# Patient Record
Sex: MALE | Race: WHITE | NOT HISPANIC OR LATINO | Employment: OTHER | URBAN - METROPOLITAN AREA
[De-identification: names, ages, dates, MRNs, and addresses within clinical notes are randomized per-mention and may not be internally consistent; named-entity substitution may affect disease eponyms.]

---

## 2020-06-01 ENCOUNTER — APPOINTMENT (OUTPATIENT)
Dept: GENERAL RADIOLOGY | Facility: CLINIC | Age: 65
End: 2020-06-01
Attending: NURSE PRACTITIONER
Payer: COMMERCIAL

## 2020-06-01 ENCOUNTER — HOSPITAL ENCOUNTER (EMERGENCY)
Facility: CLINIC | Age: 65
Discharge: HOME | End: 2020-06-01
Attending: NURSE PRACTITIONER | Admitting: NURSE PRACTITIONER
Payer: COMMERCIAL

## 2020-06-01 VITALS
WEIGHT: 222 LBS | HEIGHT: 68 IN | TEMPERATURE: 98.2 F | HEART RATE: 86 BPM | DIASTOLIC BLOOD PRESSURE: 71 MMHG | RESPIRATION RATE: 16 BRPM | BODY MASS INDEX: 33.65 KG/M2 | SYSTOLIC BLOOD PRESSURE: 132 MMHG | OXYGEN SATURATION: 98 %

## 2020-06-01 DIAGNOSIS — R07.89 CHEST WALL PAIN: ICD-10-CM

## 2020-06-01 LAB
ANION GAP SERPL CALCULATED.3IONS-SCNC: 7 MMOL/L (ref 3–14)
BASOPHILS # BLD AUTO: 0 10E9/L (ref 0–0.2)
BASOPHILS NFR BLD AUTO: 0.2 %
BUN SERPL-MCNC: 17 MG/DL (ref 7–30)
CALCIUM SERPL-MCNC: 9.2 MG/DL (ref 8.5–10.1)
CHLORIDE SERPL-SCNC: 110 MMOL/L (ref 94–109)
CO2 SERPL-SCNC: 25 MMOL/L (ref 20–32)
CREAT SERPL-MCNC: 1.07 MG/DL (ref 0.66–1.25)
DIFFERENTIAL METHOD BLD: NORMAL
EOSINOPHIL # BLD AUTO: 0.1 10E9/L (ref 0–0.7)
EOSINOPHIL NFR BLD AUTO: 2.1 %
ERYTHROCYTE [DISTWIDTH] IN BLOOD BY AUTOMATED COUNT: 13.4 % (ref 10–15)
GFR SERPL CREATININE-BSD FRML MDRD: 73 ML/MIN/{1.73_M2}
GLUCOSE SERPL-MCNC: 110 MG/DL (ref 70–99)
HCT VFR BLD AUTO: 44.1 % (ref 40–53)
HGB BLD-MCNC: 14.5 G/DL (ref 13.3–17.7)
IMM GRANULOCYTES # BLD: 0 10E9/L (ref 0–0.4)
IMM GRANULOCYTES NFR BLD: 0.2 %
INTERPRETATION ECG - MUSE: NORMAL
LYMPHOCYTES # BLD AUTO: 1.3 10E9/L (ref 0.8–5.3)
LYMPHOCYTES NFR BLD AUTO: 23.1 %
MCH RBC QN AUTO: 29.7 PG (ref 26.5–33)
MCHC RBC AUTO-ENTMCNC: 32.9 G/DL (ref 31.5–36.5)
MCV RBC AUTO: 90 FL (ref 78–100)
MONOCYTES # BLD AUTO: 0.8 10E9/L (ref 0–1.3)
MONOCYTES NFR BLD AUTO: 14 %
NEUTROPHILS # BLD AUTO: 3.5 10E9/L (ref 1.6–8.3)
NEUTROPHILS NFR BLD AUTO: 60.4 %
NRBC # BLD AUTO: 0 10*3/UL
NRBC BLD AUTO-RTO: 0 /100
PLATELET # BLD AUTO: 194 10E9/L (ref 150–450)
POTASSIUM SERPL-SCNC: 4 MMOL/L (ref 3.4–5.3)
RBC # BLD AUTO: 4.88 10E12/L (ref 4.4–5.9)
SODIUM SERPL-SCNC: 142 MMOL/L (ref 133–144)
TROPONIN I SERPL-MCNC: <0.015 UG/L (ref 0–0.04)
WBC # BLD AUTO: 5.7 10E9/L (ref 4–11)

## 2020-06-01 PROCEDURE — 99285 EMERGENCY DEPT VISIT HI MDM: CPT | Mod: 25

## 2020-06-01 PROCEDURE — 25000132 ZZH RX MED GY IP 250 OP 250 PS 637: Performed by: NURSE PRACTITIONER

## 2020-06-01 PROCEDURE — 80048 BASIC METABOLIC PNL TOTAL CA: CPT | Performed by: EMERGENCY MEDICINE

## 2020-06-01 PROCEDURE — 71046 X-RAY EXAM CHEST 2 VIEWS: CPT

## 2020-06-01 PROCEDURE — 84484 ASSAY OF TROPONIN QUANT: CPT | Performed by: EMERGENCY MEDICINE

## 2020-06-01 PROCEDURE — 85025 COMPLETE CBC W/AUTO DIFF WBC: CPT | Performed by: EMERGENCY MEDICINE

## 2020-06-01 PROCEDURE — 93005 ELECTROCARDIOGRAM TRACING: CPT

## 2020-06-01 RX ORDER — IBUPROFEN 600 MG/1
600 TABLET, FILM COATED ORAL ONCE
Status: COMPLETED | OUTPATIENT
Start: 2020-06-01 | End: 2020-06-01

## 2020-06-01 RX ORDER — AA/PROT/LYSINE/METHIO/VIT C/B6 50-12.5 MG
400 TABLET ORAL
COMMUNITY
End: 2024-03-26

## 2020-06-01 RX ORDER — METHOCARBAMOL 500 MG/1
1000 TABLET, FILM COATED ORAL ONCE
Status: COMPLETED | OUTPATIENT
Start: 2020-06-01 | End: 2020-06-01

## 2020-06-01 RX ORDER — METHOCARBAMOL 500 MG/1
TABLET, FILM COATED ORAL
Qty: 24 TABLET | Refills: 0 | Status: SHIPPED | OUTPATIENT
Start: 2020-06-01 | End: 2024-03-26

## 2020-06-01 RX ORDER — SERTRALINE HYDROCHLORIDE 100 MG/1
1 TABLET, FILM COATED ORAL
COMMUNITY
Start: 2019-10-31 | End: 2024-03-26

## 2020-06-01 RX ORDER — ATORVASTATIN CALCIUM 80 MG/1
40 TABLET, FILM COATED ORAL DAILY
COMMUNITY
Start: 2019-11-04 | End: 2024-08-01

## 2020-06-01 RX ADMIN — METHOCARBAMOL 1000 MG: 500 TABLET ORAL at 18:52

## 2020-06-01 RX ADMIN — IBUPROFEN 600 MG: 600 TABLET ORAL at 18:52

## 2020-06-01 ASSESSMENT — ENCOUNTER SYMPTOMS
SHORTNESS OF BREATH: 0
CHILLS: 0
FEVER: 0
BACK PAIN: 1
ABDOMINAL PAIN: 0

## 2020-06-01 ASSESSMENT — MIFFLIN-ST. JEOR: SCORE: 1776.49

## 2020-06-01 NOTE — ED PROVIDER NOTES
"  History     Chief Complaint:  Chest Pain      HPI   Eleno Grant is a 64 year old male who presents with a week to week and a half of anterior chest wall and parascapular discomfort.  His pain is increased with movement.  He does not have associated shortness of breath.  He describes the pain as wrapping around it does not go through his chest.  He further describes it as a \"bruised muscle.\"  He has done some gardening work otherwise no heavy lifting.  No trauma that he is aware of.  No cough or cold symptoms.  No other concerns or complaints.    Allergies:  Allergies no known allergies     Medications:    atorvastatin (LIPITOR) 80 MG tablet  methocarbamol (ROBAXIN) 500 MG tablet  sertraline (ZOLOFT) 100 MG tablet  co-enzyme Q-10 30 MG/5ML LIQD liquid        Problem List:    Patient Active Problem List    Diagnosis Date Noted     Mixed hyperlipidemia 12/31/2009     Priority: Medium     on zetia + zocor, prior lipitor       Anxiety state 12/31/2009     Priority: Medium     prior zoloft use, none since about 0756-2290, stable          Past Medical History:    No past medical history on file.    Past Surgical History:    No past surgical history on file.    Family History:    No family history on file.    Social History:  Marital Status:   [2]  Social History     Tobacco Use     Smoking status: Not on file   Substance Use Topics     Alcohol use: Not on file     Drug use: Not on file        Review of Systems   Constitutional: Negative for chills and fever.   Respiratory: Negative for shortness of breath.    Cardiovascular: Positive for chest pain.   Gastrointestinal: Negative for abdominal pain.   Musculoskeletal: Positive for back pain.   Skin: Positive for rash.   All other systems reviewed and are negative.        Physical Exam   First Vitals:  BP: (!) 142/79  Pulse: 86  Heart Rate: 88  Temp: 98.2  F (36.8  C)  Resp: 16  Height: 172.7 cm (5' 8\")  Weight: 100.7 kg (222 lb)  SpO2: 95 %      Physical " Exam  General: Alert, No obvious discomfort, well kept  Eyes: PERRL, conjunctivae pink no scleral icterus or conjunctival injection  ENT:   Moist mucus membranes, posterior oropharynx clear without erythema or exudates, No lymphadenopathy, Normal voice  Resp:  Lungs clear to auscultation bilaterally, no crackles/rubs/wheezes. Good air movement  CV:  Normal rate and rhythm, no murmurs/rubs/gallops, exactly reproducible anterior chest wall and parascapular tenderness.  Parascapular muscular spasming.  GI:  Abdomen soft and non-distended.  Normoactive BS.  No tenderness, guarding or rebound, No masses  Skin:  Warm, dry.  No rashes or petechiae  Musculoskeletal: No peripheral edema or calf tenderness, Normal gross ROM, see above.  Neuro: Alert and oriented to person/place/time, normal sensation  Psychiatric: Normal affect, cooperative, good eye contact    Emergency Department Course   ECG:       EKG Interpretation:      Interpreted by DANIEL Burroughs CNP  Time reviewed:1833  Indication: CP  Vent. Rate 94 bpm. MN interval 150 ms.   QRS duration 86 ms. QT/QTc 346/432 ms. P-R-T axis 60 7 4.   NSR, Normal ekg  No Old      Imaging:  Recent Results (from the past 24 hour(s))   XR Chest 2 Views    Narrative    CHEST TWO VIEWS  6/1/2020 6:49 PM     HISTORY:  Chest pain.    COMPARISON: None.      Impression    IMPRESSION: Tortuous thoracic aorta. Chest otherwise negative. Lungs  clear. No pleural effusions. Heart size and pulmonary vascularity are  within normal limits.    NATALIYA CARD MD       Laboratory:  Recent Results (from the past 8 hour(s))   EKG 12-lead, tracing only    Collection Time: 06/01/20  5:23 PM   Result Value Ref Range    Interpretation ECG Click View Image link to view waveform and result    CBC with platelets differential    Collection Time: 06/01/20  5:34 PM   Result Value Ref Range    WBC 5.7 4.0 - 11.0 10e9/L    RBC Count 4.88 4.4 - 5.9 10e12/L    Hemoglobin 14.5 13.3 - 17.7 g/dL    Hematocrit  44.1 40.0 - 53.0 %    MCV 90 78 - 100 fl    MCH 29.7 26.5 - 33.0 pg    MCHC 32.9 31.5 - 36.5 g/dL    RDW 13.4 10.0 - 15.0 %    Platelet Count 194 150 - 450 10e9/L    Diff Method Automated Method     % Neutrophils 60.4 %    % Lymphocytes 23.1 %    % Monocytes 14.0 %    % Eosinophils 2.1 %    % Basophils 0.2 %    % Immature Granulocytes 0.2 %    Nucleated RBCs 0 0 /100    Absolute Neutrophil 3.5 1.6 - 8.3 10e9/L    Absolute Lymphocytes 1.3 0.8 - 5.3 10e9/L    Absolute Monocytes 0.8 0.0 - 1.3 10e9/L    Absolute Eosinophils 0.1 0.0 - 0.7 10e9/L    Absolute Basophils 0.0 0.0 - 0.2 10e9/L    Abs Immature Granulocytes 0.0 0 - 0.4 10e9/L    Absolute Nucleated RBC 0.0    Basic metabolic panel    Collection Time: 06/01/20  5:34 PM   Result Value Ref Range    Sodium 142 133 - 144 mmol/L    Potassium 4.0 3.4 - 5.3 mmol/L    Chloride 110 (H) 94 - 109 mmol/L    Carbon Dioxide 25 20 - 32 mmol/L    Anion Gap 7 3 - 14 mmol/L    Glucose 110 (H) 70 - 99 mg/dL    Urea Nitrogen 17 7 - 30 mg/dL    Creatinine 1.07 0.66 - 1.25 mg/dL    GFR Estimate 73 >60 mL/min/[1.73_m2]    GFR Estimate If Black 85 >60 mL/min/[1.73_m2]    Calcium 9.2 8.5 - 10.1 mg/dL   Troponin I    Collection Time: 06/01/20  5:34 PM   Result Value Ref Range    Troponin I ES <0.015 0.000 - 0.045 ug/L     Interventions:  Medications   ibuprofen (ADVIL/MOTRIN) tablet 600 mg (600 mg Oral Given 6/1/20 1852)   methocarbamol (ROBAXIN) tablet 1,000 mg (1,000 mg Oral Given 6/1/20 1852)         Emergency Department Course:  Recheck.  I discussed the laboratory and radiology results with the patient and he understands.  The patient felt improved after the above interventions.  The patient will be discharged home to follow up with primary care doctor per discharge instructions.  Indications for return to the ED were discussed and the patient understands.  All questions were answered prior to discharge.       Impression & Plan      Medical Decision Making:  Eleno Grant is a 64  year old male who presents today for evaluation of ongoing chest wall discomfort.  He has had symptoms for approximately a week to week and a half.  His examination today was consistent with a musculoskeletal source.  Symptoms were exactly reproducible with movement and palpation.  He was given the above medication with complete relief of his symptoms.  His laboratory studies show a nonacute EKG and negative troponin.  He has a negative chest x-ray and the remainder of his laboratory studies were noncontributory.  He appears to be safe and appropriate for outpatient management follow-up.  He is given a prescription for Robaxin advised on appropriate use of NSAIDs and is discharged home.      Diagnosis:    ICD-10-CM    1. Chest wall pain  R07.89        Disposition:  discharged to home    Discharge Medications:  Discharge Medication List as of 6/1/2020  8:31 PM      START taking these medications    Details   methocarbamol (ROBAXIN) 500 MG tablet 1-2 tabs q TID PRN for muscle spasm/pain, Disp-24 tablet,R-0, Local Print             DANIEL Burroughs CNP  6/1/2020    EMERGENCY DEPARTMENT       Brody Key APRN CNP  06/01/20 6721

## 2020-06-01 NOTE — ED AVS SNAPSHOT
Emergency Department  [address] [facility] [address]  Phone:  [phone]  Fax:  [phone]                                    Eleno Grant   MRN: [id number]    Department:   Emergency Department   Date of Visit:  6/1/2020           After Visit Summary Signature Page    I have received my discharge instructions, and my questions have been answered. I have discussed any challenges I see with this plan with the nurse or doctor.    ..........................................................................................................................................  Patient/Patient Representative Signature      ..........................................................................................................................................  Patient Representative Print Name and Relationship to Patient    ..................................................               ................................................  Date                                   Time    ..........................................................................................................................................  Reviewed by Signature/Title    ...................................................              ..............................................  Date                                               Time          22EPIC Rev 08/18

## 2020-06-02 NOTE — DISCHARGE INSTRUCTIONS
Ibuprofen or Naproxen and/or Tylenol scheduled for the next 3-5 days. 600 mg (three tabs) ibuprofen, 440 mg (two tabs) Naproxen, 650-1000 mg of Tylenol.  Ibuprofen and Tylenol are every 6 hours Naproxen is 2 times daily. Follow directions. Use OTC lidocaine patches as directed.   Ice or heat to area.  I recommend ice in the first 24-48 hours.

## 2021-01-15 ENCOUNTER — HEALTH MAINTENANCE LETTER (OUTPATIENT)
Age: 66
End: 2021-01-15

## 2021-05-08 ENCOUNTER — HOSPITAL ENCOUNTER (EMERGENCY)
Facility: CLINIC | Age: 66
Discharge: HOME | End: 2021-05-08
Attending: NURSE PRACTITIONER | Admitting: NURSE PRACTITIONER
Payer: COMMERCIAL

## 2021-05-08 ENCOUNTER — APPOINTMENT (OUTPATIENT)
Dept: CT IMAGING | Facility: CLINIC | Age: 66
End: 2021-05-08
Attending: NURSE PRACTITIONER
Payer: COMMERCIAL

## 2021-05-08 VITALS
BODY MASS INDEX: 31.93 KG/M2 | DIASTOLIC BLOOD PRESSURE: 91 MMHG | SYSTOLIC BLOOD PRESSURE: 144 MMHG | RESPIRATION RATE: 16 BRPM | WEIGHT: 210 LBS | OXYGEN SATURATION: 97 % | HEART RATE: 85 BPM | TEMPERATURE: 98 F

## 2021-05-08 DIAGNOSIS — N20.1 URETERAL STONE: ICD-10-CM

## 2021-05-08 DIAGNOSIS — N20.0 KIDNEY STONE: ICD-10-CM

## 2021-05-08 LAB
ALBUMIN UR-MCNC: 20 MG/DL
ANION GAP SERPL CALCULATED.3IONS-SCNC: 1 MMOL/L (ref 3–14)
APPEARANCE UR: CLEAR
BASOPHILS # BLD AUTO: 0 10E9/L (ref 0–0.2)
BASOPHILS NFR BLD AUTO: 0.3 %
BILIRUB UR QL STRIP: NEGATIVE
BUN SERPL-MCNC: 17 MG/DL (ref 7–30)
CALCIUM SERPL-MCNC: 8.6 MG/DL (ref 8.5–10.1)
CHLORIDE SERPL-SCNC: 111 MMOL/L (ref 94–109)
CO2 SERPL-SCNC: 28 MMOL/L (ref 20–32)
COLOR UR AUTO: YELLOW
CREAT SERPL-MCNC: 1.03 MG/DL (ref 0.66–1.25)
DIFFERENTIAL METHOD BLD: NORMAL
EOSINOPHIL # BLD AUTO: 0 10E9/L (ref 0–0.7)
EOSINOPHIL NFR BLD AUTO: 0 %
ERYTHROCYTE [DISTWIDTH] IN BLOOD BY AUTOMATED COUNT: 13.3 % (ref 10–15)
GFR SERPL CREATININE-BSD FRML MDRD: 76 ML/MIN/{1.73_M2}
GLUCOSE SERPL-MCNC: 105 MG/DL (ref 70–99)
GLUCOSE UR STRIP-MCNC: NEGATIVE MG/DL
HCT VFR BLD AUTO: 45.3 % (ref 40–53)
HGB BLD-MCNC: 14.8 G/DL (ref 13.3–17.7)
HGB UR QL STRIP: ABNORMAL
IMM GRANULOCYTES # BLD: 0 10E9/L (ref 0–0.4)
IMM GRANULOCYTES NFR BLD: 0.5 %
KETONES UR STRIP-MCNC: 5 MG/DL
LEUKOCYTE ESTERASE UR QL STRIP: NEGATIVE
LYMPHOCYTES # BLD AUTO: 0.8 10E9/L (ref 0.8–5.3)
LYMPHOCYTES NFR BLD AUTO: 10.2 %
MCH RBC QN AUTO: 29.4 PG (ref 26.5–33)
MCHC RBC AUTO-ENTMCNC: 32.7 G/DL (ref 31.5–36.5)
MCV RBC AUTO: 90 FL (ref 78–100)
MONOCYTES # BLD AUTO: 0.5 10E9/L (ref 0–1.3)
MONOCYTES NFR BLD AUTO: 6.7 %
MUCOUS THREADS #/AREA URNS LPF: PRESENT /LPF
NEUTROPHILS # BLD AUTO: 6.3 10E9/L (ref 1.6–8.3)
NEUTROPHILS NFR BLD AUTO: 82.3 %
NITRATE UR QL: NEGATIVE
NRBC # BLD AUTO: 0 10*3/UL
NRBC BLD AUTO-RTO: 0 /100
PH UR STRIP: 6 PH (ref 5–7)
PLATELET # BLD AUTO: 210 10E9/L (ref 150–450)
POTASSIUM SERPL-SCNC: 4.4 MMOL/L (ref 3.4–5.3)
RBC # BLD AUTO: 5.04 10E12/L (ref 4.4–5.9)
RBC #/AREA URNS AUTO: >182 /HPF (ref 0–2)
SODIUM SERPL-SCNC: 140 MMOL/L (ref 133–144)
SOURCE: ABNORMAL
SP GR UR STRIP: 1.03 (ref 1–1.03)
SQUAMOUS #/AREA URNS AUTO: 0 /HPF (ref 0–1)
UROBILINOGEN UR STRIP-MCNC: 0 MG/DL (ref 0–2)
WBC # BLD AUTO: 7.7 10E9/L (ref 4–11)
WBC #/AREA URNS AUTO: 1 /HPF (ref 0–5)

## 2021-05-08 PROCEDURE — 80048 BASIC METABOLIC PNL TOTAL CA: CPT | Performed by: NURSE PRACTITIONER

## 2021-05-08 PROCEDURE — 81001 URINALYSIS AUTO W/SCOPE: CPT | Performed by: NURSE PRACTITIONER

## 2021-05-08 PROCEDURE — 250N000011 HC RX IP 250 OP 636: Performed by: NURSE PRACTITIONER

## 2021-05-08 PROCEDURE — 96374 THER/PROPH/DIAG INJ IV PUSH: CPT

## 2021-05-08 PROCEDURE — 96361 HYDRATE IV INFUSION ADD-ON: CPT

## 2021-05-08 PROCEDURE — 74176 CT ABD & PELVIS W/O CONTRAST: CPT

## 2021-05-08 PROCEDURE — 99285 EMERGENCY DEPT VISIT HI MDM: CPT | Mod: 25

## 2021-05-08 PROCEDURE — 85025 COMPLETE CBC W/AUTO DIFF WBC: CPT | Performed by: NURSE PRACTITIONER

## 2021-05-08 PROCEDURE — 258N000003 HC RX IP 258 OP 636: Performed by: NURSE PRACTITIONER

## 2021-05-08 PROCEDURE — 96375 TX/PRO/DX INJ NEW DRUG ADDON: CPT

## 2021-05-08 RX ORDER — ONDANSETRON 4 MG/1
4 TABLET, ORALLY DISINTEGRATING ORAL EVERY 8 HOURS PRN
Qty: 10 TABLET | Refills: 0 | Status: SHIPPED | OUTPATIENT
Start: 2021-05-08 | End: 2021-05-11

## 2021-05-08 RX ORDER — OXYCODONE AND ACETAMINOPHEN 5; 325 MG/1; MG/1
1-2 TABLET ORAL EVERY 4 HOURS PRN
Qty: 12 TABLET | Refills: 0 | Status: SHIPPED | OUTPATIENT
Start: 2021-05-08 | End: 2024-03-26

## 2021-05-08 RX ORDER — ONDANSETRON 2 MG/ML
4 INJECTION INTRAMUSCULAR; INTRAVENOUS
Status: COMPLETED | OUTPATIENT
Start: 2021-05-08 | End: 2021-05-08

## 2021-05-08 RX ORDER — KETOROLAC TROMETHAMINE 15 MG/ML
15 INJECTION, SOLUTION INTRAMUSCULAR; INTRAVENOUS ONCE
Status: COMPLETED | OUTPATIENT
Start: 2021-05-08 | End: 2021-05-08

## 2021-05-08 RX ORDER — TAMSULOSIN HYDROCHLORIDE 0.4 MG/1
0.4 CAPSULE ORAL DAILY
Qty: 10 CAPSULE | Refills: 0 | Status: SHIPPED | OUTPATIENT
Start: 2021-05-08 | End: 2021-05-18

## 2021-05-08 RX ADMIN — KETOROLAC TROMETHAMINE 15 MG: 15 INJECTION, SOLUTION INTRAMUSCULAR; INTRAVENOUS at 11:56

## 2021-05-08 RX ADMIN — ONDANSETRON 4 MG: 2 INJECTION INTRAMUSCULAR; INTRAVENOUS at 11:56

## 2021-05-08 RX ADMIN — SODIUM CHLORIDE 1000 ML: 9 INJECTION, SOLUTION INTRAVENOUS at 11:56

## 2021-05-08 ASSESSMENT — ENCOUNTER SYMPTOMS
DYSURIA: 0
VOMITING: 0
HEMATURIA: 0
SHORTNESS OF BREATH: 0
CHILLS: 0
FEVER: 0
ABDOMINAL PAIN: 1
NAUSEA: 1
FLANK PAIN: 1
DIAPHORESIS: 1
COUGH: 0

## 2021-05-08 NOTE — ED TRIAGE NOTES
R flank pain, intermittent. Started at 0500. Some nausea. No urinary symptoms. No hx kidney stones

## 2021-05-08 NOTE — ED PROVIDER NOTES
History   Chief Complaint:  Flank Pain    HPI   Eleno Grant is a 65 year old male, with a history of hyperlipidemia, who presents to the ED for evaluation of flank pain. The patient reports he had the onset of sharp, intermittent, right flank pain this morning at 0500. He states he became nauseous, diaphoretic, and had to lie on the floor, but did not vomit. He took one Aleve, but did not have any improvement in his symptoms, therefore, he came into the emergency department. He has not had any fever, chills, or shortness of breath. He denies chest pain. He notes the pain can somewhat wrap into his right lower abdomen, but not very intense. He notes the pain is mostly a dull pain when it is there, but will have those sharp moments now. He has not noticed any hematuria and he has no dysuria.     Review of Systems   Constitutional: Positive for diaphoresis. Negative for chills and fever.   Respiratory: Negative for cough and shortness of breath.    Cardiovascular: Negative for chest pain.   Gastrointestinal: Positive for abdominal pain and nausea. Negative for vomiting.   Genitourinary: Positive for flank pain. Negative for dysuria and hematuria.   All other systems reviewed and are negative.    Allergies:  No known drug allergies    Medications:    Lipitor  Zoloft    Past Medical History:    Hyperlipidemia  Anxiety    Past Surgical History:    Left ACL reconstruction  [place] teeth extraction  Vasectomy    Family History:    Atrial fibrillation  Hyperlipidemia  Alzheimer disease    Social History:  PCP: Emelia Jha  Presents to the ED with spouse    Physical Exam     Patient Vitals for the past 24 hrs:   BP Temp Temp src Pulse Resp SpO2 Weight   05/08/21 1353 -- -- -- 85 16 97 % --   05/08/21 1124 (!) 144/91 98  F (36.7  C) Oral 86 16 97 % 95.3 kg (210 lb)       Physical Exam  Nursing notes reviewed. Vitals reviewed.  General: Alert. Well kept.  Eyes:  Conjunctiva non-injected, non-icteric.  Neck/Throat:  Moist mucous membranes.  Normal voice.  Cardiac: Regular rhythm. Normal heart sounds with no murmur/rubs/click.   Pulmonary: Clear and equal breath sounds bilaterally. No crackles/rales. No wheezing  Abdomen: Soft. Non-distended. No masses. No guarding or rebound. Right sided CVA tenderness.  Musculoskeletal: Normal gross range of motion of all 4 extremities.    Neurological: Alert and oriented x4.   Skin: Warm and dry without rashes or petechiae. Normal appearance of visualized exposed skin.  Psych: Affect normal. Good eye contact.    Emergency Department Course   Imaging:    CT Abd/pelvis with contrast:  3 mm calculus in the proximal right ureter causes mild   hydronephrosis and proximal hydroureter. There is an additional 5 mm   calcification in the right kidney.     Imaging independently reviewed and agree with radiologist interpretation.     Laboratory:  CBC: WBC 7.7, HGB 14.8,     CMP: Cl 111 (H), Anion Gap 1 (L),  (H), o/w WNL (Creatinine 1.03)    UA: Ketone 5, Blood Large, Protein Albumin 20, RBC/HPF >182 (H), Mucous Present, o/w Negative    Emergency Department Course:    Reviewed:  I reviewed the patient's nursing notes, vitals, past available medical records.     Assessments:  1127: I obtained history and examined the patient as noted above.     1305: I rechecked the patient and explained findings. The patient feels much improved and is amendable to discharge. All questions answered.     Interventions:  1156: NS 1L IV Bolus   1156: Zofran 4 mg IV  1156: Toradol 15 mg IV    Disposition:  The patient was discharged to home.    Impression & Plan    Medical Decision Making:  The patient presented with unilateral flank, with minimal abdominal pain, consistent with renal colic. The patient reports he woke up this morning at 0500 with initially sharp right flank pain, which reduced to a constant dull ache. he had not had this pain before. He was nauseous due to the pain, but did not vomit, and had  little to no relief with Aleve, which prompted his emergency department visit today. CT confirms a ureteral stone. Pain is controlled with interventions in the emergency department. There is no fever or evidence of a urinary tract infection. The patient will be discharged with opioid analgesics and ibuprofen for pain. Flomax will be prescribed daily to attempt to ease stone passage.   Zofran was prescribed for nausea.  I considered other etiologies for these symptoms including AAA and pyelonephritis but these are unlikely given the otherwise normal CT scan and urinalysis.  The patient is instructed to return if increasing pain not controlled with pain meds, vomiting, and fever. Strain urine to look for stone, if detected, submit to primary doctor for lab analysis. Instructions were given to follow up with urology within one week, sooner if pain continues, as retrieval of the stone may be required for refractory symptoms.    Diagnosis:    ICD-10-CM    1. Ureteral stone  N20.1    2. Kidney stone  N20.0        Discharge Medications:  New Prescriptions    ONDANSETRON (ZOFRAN ODT) 4 MG ODT TAB    Take 1 tablet (4 mg) by mouth every 8 hours as needed    OXYCODONE-ACETAMINOPHEN (PERCOCET) 5-325 MG TABLET    Take 1-2 tablets by mouth every 4 hours as needed for pain    TAMSULOSIN (FLOMAX) 0.4 MG CAPSULE    Take 1 capsule (0.4 mg) by mouth daily for 10 doses     Scribe Disclosure:  Alireza LEDESMA, am serving as a scribe at 11:27 AM on 5/8/2021 to document services personally performed by Diane Rocha DNP based on my observations and the provider's statements to me.      Diane Rocha, CNP  05/08/21 9209

## 2021-08-29 ENCOUNTER — HEALTH MAINTENANCE LETTER (OUTPATIENT)
Age: 66
End: 2021-08-29

## 2021-10-24 ENCOUNTER — HEALTH MAINTENANCE LETTER (OUTPATIENT)
Age: 66
End: 2021-10-24

## 2022-10-15 ENCOUNTER — HEALTH MAINTENANCE LETTER (OUTPATIENT)
Age: 67
End: 2022-10-15

## 2023-01-11 ENCOUNTER — HOSPITAL ENCOUNTER (EMERGENCY)
Facility: CLINIC | Age: 68
Discharge: LEFT BEFORE DISCHARGE | End: 2023-01-11
Payer: COMMERCIAL

## 2023-01-11 NOTE — ED TRIAGE NOTES
Right flank pain for a couple days. Hx kidney stones. Feels like a stone. Denies fevers.      Triage Assessment     Row Name 01/11/23 2146       Triage Assessment (Adult)    Airway WDL WDL

## 2023-03-26 ENCOUNTER — HEALTH MAINTENANCE LETTER (OUTPATIENT)
Age: 68
End: 2023-03-26

## 2024-03-26 ENCOUNTER — HOSPITAL ENCOUNTER (OUTPATIENT)
Facility: CLINIC | Age: 69
Setting detail: OBSERVATION
Discharge: HOME | End: 2024-03-27
Attending: EMERGENCY MEDICINE | Admitting: STUDENT IN AN ORGANIZED HEALTH CARE EDUCATION/TRAINING PROGRAM
Payer: COMMERCIAL

## 2024-03-26 ENCOUNTER — APPOINTMENT (OUTPATIENT)
Dept: GENERAL RADIOLOGY | Facility: CLINIC | Age: 69
End: 2024-03-26
Attending: EMERGENCY MEDICINE
Payer: COMMERCIAL

## 2024-03-26 DIAGNOSIS — I48.91 ATRIAL FIBRILLATION WITH RVR (H): ICD-10-CM

## 2024-03-26 DIAGNOSIS — R55 SYNCOPE, UNSPECIFIED SYNCOPE TYPE: ICD-10-CM

## 2024-03-26 LAB
ALBUMIN SERPL BCG-MCNC: 4.2 G/DL (ref 3.5–5.2)
ALP SERPL-CCNC: 70 U/L (ref 40–150)
ALT SERPL W P-5'-P-CCNC: NORMAL U/L
ANION GAP SERPL CALCULATED.3IONS-SCNC: 8 MMOL/L (ref 7–15)
AST SERPL W P-5'-P-CCNC: 38 U/L (ref 0–45)
ATRIAL RATE - MUSE: 86 BPM
ATRIAL RATE - MUSE: NORMAL BPM
BASOPHILS # BLD AUTO: 0 10E3/UL (ref 0–0.2)
BASOPHILS NFR BLD AUTO: 0 %
BILIRUB DIRECT SERPL-MCNC: <0.2 MG/DL (ref 0–0.3)
BILIRUB SERPL-MCNC: 0.4 MG/DL
BUN SERPL-MCNC: 17.9 MG/DL (ref 8–23)
CALCIUM SERPL-MCNC: 9.6 MG/DL (ref 8.8–10.2)
CHLORIDE SERPL-SCNC: 105 MMOL/L (ref 98–107)
CREAT SERPL-MCNC: 1.29 MG/DL (ref 0.67–1.17)
DEPRECATED HCO3 PLAS-SCNC: 27 MMOL/L (ref 22–29)
DIASTOLIC BLOOD PRESSURE - MUSE: NORMAL MMHG
DIASTOLIC BLOOD PRESSURE - MUSE: NORMAL MMHG
EGFRCR SERPLBLD CKD-EPI 2021: 61 ML/MIN/1.73M2
EOSINOPHIL # BLD AUTO: 0.1 10E3/UL (ref 0–0.7)
EOSINOPHIL NFR BLD AUTO: 1 %
ERYTHROCYTE [DISTWIDTH] IN BLOOD BY AUTOMATED COUNT: 13.4 % (ref 10–15)
GLUCOSE SERPL-MCNC: 98 MG/DL (ref 70–99)
HCT VFR BLD AUTO: 45.5 % (ref 40–53)
HGB BLD-MCNC: 15 G/DL (ref 13.3–17.7)
IMM GRANULOCYTES # BLD: 0 10E3/UL
IMM GRANULOCYTES NFR BLD: 0 %
INTERPRETATION ECG - MUSE: NORMAL
INTERPRETATION ECG - MUSE: NORMAL
LYMPHOCYTES # BLD AUTO: 1.3 10E3/UL (ref 0.8–5.3)
LYMPHOCYTES NFR BLD AUTO: 18 %
MCH RBC QN AUTO: 29.7 PG (ref 26.5–33)
MCHC RBC AUTO-ENTMCNC: 33 G/DL (ref 31.5–36.5)
MCV RBC AUTO: 90 FL (ref 78–100)
MONOCYTES # BLD AUTO: 0.8 10E3/UL (ref 0–1.3)
MONOCYTES NFR BLD AUTO: 12 %
NEUTROPHILS # BLD AUTO: 4.8 10E3/UL (ref 1.6–8.3)
NEUTROPHILS NFR BLD AUTO: 69 %
NRBC # BLD AUTO: 0 10E3/UL
NRBC BLD AUTO-RTO: 0 /100
P AXIS - MUSE: 61 DEGREES
P AXIS - MUSE: NORMAL DEGREES
PLATELET # BLD AUTO: 201 10E3/UL (ref 150–450)
POTASSIUM SERPL-SCNC: 5.3 MMOL/L (ref 3.4–5.3)
PR INTERVAL - MUSE: 152 MS
PR INTERVAL - MUSE: NORMAL MS
PROT SERPL-MCNC: 6.8 G/DL (ref 6.4–8.3)
QRS DURATION - MUSE: 76 MS
QRS DURATION - MUSE: 84 MS
QT - MUSE: 312 MS
QT - MUSE: 368 MS
QTC - MUSE: 408 MS
QTC - MUSE: 477 MS
R AXIS - MUSE: 10 DEGREES
R AXIS - MUSE: 30 DEGREES
RBC # BLD AUTO: 5.05 10E6/UL (ref 4.4–5.9)
SODIUM SERPL-SCNC: 140 MMOL/L (ref 135–145)
SYSTOLIC BLOOD PRESSURE - MUSE: NORMAL MMHG
SYSTOLIC BLOOD PRESSURE - MUSE: NORMAL MMHG
T AXIS - MUSE: 30 DEGREES
T AXIS - MUSE: 7 DEGREES
TROPONIN T SERPL HS-MCNC: 22 NG/L
TSH SERPL DL<=0.005 MIU/L-ACNC: 1.58 UIU/ML (ref 0.3–4.2)
VENTRICULAR RATE- MUSE: 141 BPM
VENTRICULAR RATE- MUSE: 74 BPM
WBC # BLD AUTO: 7.1 10E3/UL (ref 4–11)

## 2024-03-26 PROCEDURE — 93005 ELECTROCARDIOGRAM TRACING: CPT | Mod: 76

## 2024-03-26 PROCEDURE — G0378 HOSPITAL OBSERVATION PER HR: HCPCS

## 2024-03-26 PROCEDURE — 96361 HYDRATE IV INFUSION ADD-ON: CPT

## 2024-03-26 PROCEDURE — 84443 ASSAY THYROID STIM HORMONE: CPT | Performed by: STUDENT IN AN ORGANIZED HEALTH CARE EDUCATION/TRAINING PROGRAM

## 2024-03-26 PROCEDURE — 84075 ASSAY ALKALINE PHOSPHATASE: CPT | Performed by: EMERGENCY MEDICINE

## 2024-03-26 PROCEDURE — 85025 COMPLETE CBC W/AUTO DIFF WBC: CPT | Performed by: EMERGENCY MEDICINE

## 2024-03-26 PROCEDURE — 36415 COLL VENOUS BLD VENIPUNCTURE: CPT | Performed by: EMERGENCY MEDICINE

## 2024-03-26 PROCEDURE — 71046 X-RAY EXAM CHEST 2 VIEWS: CPT

## 2024-03-26 PROCEDURE — 99223 1ST HOSP IP/OBS HIGH 75: CPT | Performed by: STUDENT IN AN ORGANIZED HEALTH CARE EDUCATION/TRAINING PROGRAM

## 2024-03-26 PROCEDURE — 99285 EMERGENCY DEPT VISIT HI MDM: CPT | Mod: 25

## 2024-03-26 PROCEDURE — 96360 HYDRATION IV INFUSION INIT: CPT

## 2024-03-26 PROCEDURE — 84484 ASSAY OF TROPONIN QUANT: CPT | Performed by: EMERGENCY MEDICINE

## 2024-03-26 PROCEDURE — 93005 ELECTROCARDIOGRAM TRACING: CPT

## 2024-03-26 PROCEDURE — 82374 ASSAY BLOOD CARBON DIOXIDE: CPT | Performed by: EMERGENCY MEDICINE

## 2024-03-26 PROCEDURE — 258N000003 HC RX IP 258 OP 636: Performed by: EMERGENCY MEDICINE

## 2024-03-26 PROCEDURE — 250N000013 HC RX MED GY IP 250 OP 250 PS 637: Performed by: STUDENT IN AN ORGANIZED HEALTH CARE EDUCATION/TRAINING PROGRAM

## 2024-03-26 PROCEDURE — 84155 ASSAY OF PROTEIN SERUM: CPT | Performed by: EMERGENCY MEDICINE

## 2024-03-26 RX ORDER — TAMSULOSIN HYDROCHLORIDE 0.4 MG/1
0.4 CAPSULE ORAL DAILY
COMMUNITY

## 2024-03-26 RX ORDER — ONDANSETRON 4 MG/1
4 TABLET, ORALLY DISINTEGRATING ORAL EVERY 6 HOURS PRN
Status: DISCONTINUED | OUTPATIENT
Start: 2024-03-26 | End: 2024-03-27 | Stop reason: HOSPADM

## 2024-03-26 RX ORDER — TAMSULOSIN HYDROCHLORIDE 0.4 MG/1
0.4 CAPSULE ORAL DAILY
Status: DISCONTINUED | OUTPATIENT
Start: 2024-03-27 | End: 2024-03-27 | Stop reason: HOSPADM

## 2024-03-26 RX ORDER — SERTRALINE HYDROCHLORIDE 100 MG/1
100 TABLET, FILM COATED ORAL DAILY
Status: CANCELLED | OUTPATIENT
Start: 2024-03-26

## 2024-03-26 RX ORDER — HEPARIN SODIUM 10000 [USP'U]/100ML
0-5000 INJECTION, SOLUTION INTRAVENOUS CONTINUOUS
Status: DISCONTINUED | OUTPATIENT
Start: 2024-03-26 | End: 2024-03-26

## 2024-03-26 RX ORDER — DESVENLAFAXINE 50 MG/1
50 TABLET, FILM COATED, EXTENDED RELEASE ORAL DAILY
Status: DISCONTINUED | OUTPATIENT
Start: 2024-03-27 | End: 2024-03-27 | Stop reason: HOSPADM

## 2024-03-26 RX ORDER — OXYCODONE AND ACETAMINOPHEN 5; 325 MG/1; MG/1
1-2 TABLET ORAL EVERY 4 HOURS PRN
Status: CANCELLED | OUTPATIENT
Start: 2024-03-26

## 2024-03-26 RX ORDER — ONDANSETRON 2 MG/ML
4 INJECTION INTRAMUSCULAR; INTRAVENOUS EVERY 6 HOURS PRN
Status: DISCONTINUED | OUTPATIENT
Start: 2024-03-26 | End: 2024-03-27 | Stop reason: HOSPADM

## 2024-03-26 RX ORDER — DESVENLAFAXINE 50 MG/1
50 TABLET, FILM COATED, EXTENDED RELEASE ORAL DAILY
COMMUNITY

## 2024-03-26 RX ORDER — AMOXICILLIN 250 MG
1 CAPSULE ORAL 2 TIMES DAILY PRN
Status: DISCONTINUED | OUTPATIENT
Start: 2024-03-26 | End: 2024-03-27 | Stop reason: HOSPADM

## 2024-03-26 RX ORDER — ATORVASTATIN CALCIUM 80 MG/1
80 TABLET, FILM COATED ORAL EVERY EVENING
Status: CANCELLED | OUTPATIENT
Start: 2024-03-26

## 2024-03-26 RX ORDER — AMOXICILLIN 250 MG
2 CAPSULE ORAL 2 TIMES DAILY PRN
Status: DISCONTINUED | OUTPATIENT
Start: 2024-03-26 | End: 2024-03-27 | Stop reason: HOSPADM

## 2024-03-26 RX ORDER — DILTIAZEM HYDROCHLORIDE 5 MG/ML
10 INJECTION INTRAVENOUS ONCE
Status: DISCONTINUED | OUTPATIENT
Start: 2024-03-26 | End: 2024-03-26

## 2024-03-26 RX ORDER — EZETIMIBE 10 MG/1
10 TABLET ORAL DAILY
COMMUNITY

## 2024-03-26 RX ADMIN — SODIUM CHLORIDE 1000 ML: 9 INJECTION, SOLUTION INTRAVENOUS at 16:17

## 2024-03-26 RX ADMIN — METOPROLOL TARTRATE 12.5 MG: 25 TABLET, FILM COATED ORAL at 20:57

## 2024-03-26 ASSESSMENT — ACTIVITIES OF DAILY LIVING (ADL)
ADLS_ACUITY_SCORE: 35
ADLS_ACUITY_SCORE: 35
ADLS_ACUITY_SCORE: 34
ADLS_ACUITY_SCORE: 35
ADLS_ACUITY_SCORE: 35
ADLS_ACUITY_SCORE: 36
ADLS_ACUITY_SCORE: 35

## 2024-03-26 NOTE — ED PROVIDER NOTES
"  History     Chief Complaint:  Dizziness (/)       HPI   Eleno Grant is a 68 year old male who presents with a near syncopal episode earlier today.  He states that about a week ago he was in [employer] and had an episode where he got dizzy and fainted to the ground, went to the ER and had a negative workup including negative EKG.  He states that today he has been being sure to stay hydrated as he was told in [place] that this was likely fainted.  He states that he has had no chest pain or palpitations, but at a certain point while at the car shop, he noted that he was about to faint but at no point to the actual fall the ground.    States at this point he has no palpitations or pain, feels comfortable and asymptomatic at rest.  Denies any nausea vomiting diarrhea, no recent illnesses, no recent medication changes.  His brother does have A-fib however.      Independent Historian:   No    Review of External Notes: Yes I reviewed the patient's last office visit with his internist on 18 December of 23 where he was seen by his internist.      Allergies:  No Known Allergies     Medications:    atorvastatin (LIPITOR) 80 MG tablet  co-enzyme Q-10 30 MG/5ML LIQD liquid  methocarbamol (ROBAXIN) 500 MG tablet  oxyCODONE-acetaminophen (PERCOCET) 5-325 MG tablet  sertraline (ZOLOFT) 100 MG tablet        Past Medical History:    No past medical history on file.    Past Surgical History:    No past surgical history on file.     Family History:    family history is not on file.    Social History:     PCP: Emelia Jha     Physical Exam   Patient Vitals for the past 24 hrs:   BP Temp Temp src Pulse Resp SpO2 Height Weight   03/26/24 1618 107/74 98  F (36.7  C) Oral 109 16 99 % -- --   03/26/24 1612 -- -- -- -- -- -- 1.727 m (5' 8\") 83.9 kg (185 lb)        Physical Exam  Vitals: reviewed by me  General: Pt seen on [facility], pleasant, cooperative, and alert to conversation  Eyes: Tracking well, clear " conjunctiva BL  ENT: MMM, midline trachea.   Lungs: No tachypnea, no accessory muscle use. No respiratory distress.   CV: Rate as above, quite rapid, appears irregularly irregular on the monitor.  Abd: Soft, non tender, no guarding, no rebound. Non distended  MSK: no joint effusion.  No evidence of trauma  Skin: No rash  Neuro: Clear speech and no facial droop.  Psych: Not RIS, no e/o AH/VH      Emergency Department Course     ECG results from 03/26/24   EKG 12-lead, tracing only     Value    Systolic Blood Pressure     Diastolic Blood Pressure     Ventricular Rate 141    Atrial Rate     MS Interval     QRS Duration 76        QTc 477    P Axis     R AXIS 30    T Axis 7    Interpretation ECG      ** Critical Test Result: High HR  Atrial fibrillation with rapid ventricular response  Nonspecific ST abnormality  Abnormal ECG  Reviewed by Juan DOMINGUEZ           Laboratory:  Labs Ordered and Resulted from Time of ED Arrival to Time of ED Departure   BASIC METABOLIC PANEL - Abnormal       Result Value    Sodium 140      Potassium 5.3      Chloride 105      Carbon Dioxide (CO2) 27      Anion Gap 8      Urea Nitrogen 17.9      Creatinine 1.29 (*)     GFR Estimate 61      Calcium 9.6      Glucose 98     CBC WITH PLATELETS AND DIFFERENTIAL    WBC Count 7.1      RBC Count 5.05      Hemoglobin 15.0      Hematocrit 45.5      MCV 90      MCH 29.7      MCHC 33.0      RDW 13.4      Platelet Count 201      % Neutrophils 69      % Lymphocytes 18      % Monocytes 12      % Eosinophils 1      % Basophils 0      % Immature Granulocytes 0      NRBCs per 100 WBC 0      Absolute Neutrophils 4.8      Absolute Lymphocytes 1.3      Absolute Monocytes 0.8      Absolute Eosinophils 0.1      Absolute Basophils 0.0      Absolute Immature Granulocytes 0.0      Absolute NRBCs 0.0     HEPATIC FUNCTION PANEL    Protein Total 6.8      Albumin 4.2      Bilirubin Total 0.4      Alkaline Phosphatase 70      AST 38      ALT        Bilirubin Direct  <0.20     TROPONIN T, HIGH SENSITIVITY            Emergency Department Course & Assessments:          Interventions:  Medications   diltiazem (CARDIZEM) injection 10 mg (has no administration in time range)   diltiazem (CARDIZEM) 125 mg in sodium chloride 0.9 % 125 mL infusion (has no administration in time range)   heparin loading dose for LOW INTENSITY TREATMENT * Give BEFORE starting heparin infusion (has no administration in time range)   heparin infusion 25,000 units in D5W 250 mL ANTICOAGULANT (has no administration in time range)   sodium chloride 0.9% BOLUS 1,000 mL (1,000 mLs Intravenous $New Bag 3/26/24 3178)   Diltiazem and heparin not started as patient cardioverted prior to getting his meds             Social Determinants of Health affecting care:   Stress/Adjustment Disorders      Disposition:  The patient was admitted to the hospital under the care of Dr. Agudelo.     Impression & Plan        Medical Decision Making:  This is a very pleasant 68-year-old gentleman who presents the emergency room with multiple syncopal episodes over the last week, and with what appears to be a new onset diagnosis of A-fib with RVR.  He was resting comfortably with a normal blood pressure at rest, but does clearly endorse some near syncope when he gets up and moves.  He has no chest pain, however his brother has a history of A-fib and I do think that that is likely what is causing his fainting episodes.  Thankfully he cardioverted back to normal sinus rhythm while waiting here in the ER and before he received heparin or diltiazem.  However I still think he needs to come to the hospital for an echo and for a cardiology consult since these fainting episodes have the potential to be quite severe and I do think that more prompt workup then in the outpatient system can accommodate is the next appropriate step.  Patient and family feel comfortable with this plan, I spoke to the hospitalist team who is very generously agreed  except care of the patient.    Diagnosis:  No diagnosis found.     Discharge Medications:  New Prescriptions    No medications on file          3/26/2024   Ismael Martinez*        Ismael Martinez MD  03/26/24 5710

## 2024-03-26 NOTE — ED NOTES
[facility]  ED Nurse Handoff Report    ED Chief complaint: Dizziness (/)      ED Diagnosis:   Final diagnoses:   None       Code Status: Full Code    Allergies: No Known Allergies    Patient Story: Eleno Grant is a 68 year old male who presents to the Emergency Department for an evaluation of dizziness.   Focused Assessment:  Cardiac: SR  Resp: WDL  Neuro:A&Ox4     Treatments and/or interventions provided: NS 1000ml bolus  Patient's response to treatments and/or interventions: na    To be done/followed up on inpatient unit:  continue care    Does this patient have any cognitive concerns?:  na    Activity level - Baseline/Home:  Independent  Activity Level - Current:   Independent    Patient's Preferred language: English   Needed?: No    Isolation: None  Infection: Not Applicable  Patient tested for COVID 19 prior to admission: NO  Bariatric?: No    Vital Signs:   Vitals:    03/26/24 1757 03/26/24 1800 03/26/24 1801 03/26/24 1824   BP:  120/85 119/89    Pulse: 79 79 76 73   Resp: 13 12 15 10   Temp:       TempSrc:       SpO2: 99% 100% 99% 99%   Weight:       Height:           Cardiac Rhythm:     Was the PSS-3 completed:   Yes  What interventions are required if any?               Family Comments: family at bedside  OBS brochure/video discussed/provided to patient/family: No              Name of person given brochure if not patient: na              Relationship to patient: na    For the majority of the shift this patient's behavior was Green.   Behavioral interventions performed were na.    ED NURSE PHONE NUMBER: *[id number]

## 2024-03-26 NOTE — ED TRIAGE NOTES
Pt has had mutliple bouts of dizziness and low blood pressure that has been due to being dehydrated   Pt has unexplained low blood pressure

## 2024-03-27 ENCOUNTER — APPOINTMENT (OUTPATIENT)
Dept: CARDIOLOGY | Facility: CLINIC | Age: 69
End: 2024-03-27
Attending: HOSPITALIST
Payer: COMMERCIAL

## 2024-03-27 ENCOUNTER — APPOINTMENT (OUTPATIENT)
Dept: CARDIOLOGY | Facility: CLINIC | Age: 69
End: 2024-03-27
Attending: STUDENT IN AN ORGANIZED HEALTH CARE EDUCATION/TRAINING PROGRAM
Payer: COMMERCIAL

## 2024-03-27 VITALS
WEIGHT: 192.7 LBS | BODY MASS INDEX: 29.21 KG/M2 | HEART RATE: 68 BPM | RESPIRATION RATE: 18 BRPM | DIASTOLIC BLOOD PRESSURE: 80 MMHG | OXYGEN SATURATION: 95 % | TEMPERATURE: 98.1 F | HEIGHT: 68 IN | SYSTOLIC BLOOD PRESSURE: 113 MMHG

## 2024-03-27 LAB
ANION GAP SERPL CALCULATED.3IONS-SCNC: 8 MMOL/L (ref 7–15)
BUN SERPL-MCNC: 14.3 MG/DL (ref 8–23)
CALCIUM SERPL-MCNC: 8.8 MG/DL (ref 8.8–10.2)
CHLORIDE SERPL-SCNC: 109 MMOL/L (ref 98–107)
CREAT SERPL-MCNC: 1.26 MG/DL (ref 0.67–1.17)
DEPRECATED HCO3 PLAS-SCNC: 26 MMOL/L (ref 22–29)
EGFRCR SERPLBLD CKD-EPI 2021: 63 ML/MIN/1.73M2
GLUCOSE SERPL-MCNC: 90 MG/DL (ref 70–99)
LVEF ECHO: NORMAL
POTASSIUM SERPL-SCNC: 4.6 MMOL/L (ref 3.4–5.3)
SODIUM SERPL-SCNC: 143 MMOL/L (ref 135–145)

## 2024-03-27 PROCEDURE — 255N000002 HC RX 255 OP 636: Performed by: STUDENT IN AN ORGANIZED HEALTH CARE EDUCATION/TRAINING PROGRAM

## 2024-03-27 PROCEDURE — 36415 COLL VENOUS BLD VENIPUNCTURE: CPT | Performed by: STUDENT IN AN ORGANIZED HEALTH CARE EDUCATION/TRAINING PROGRAM

## 2024-03-27 PROCEDURE — 93270 REMOTE 30 DAY ECG REV/REPORT: CPT

## 2024-03-27 PROCEDURE — 80048 BASIC METABOLIC PNL TOTAL CA: CPT | Performed by: STUDENT IN AN ORGANIZED HEALTH CARE EDUCATION/TRAINING PROGRAM

## 2024-03-27 PROCEDURE — 99239 HOSP IP/OBS DSCHRG MGMT >30: CPT | Performed by: HOSPITALIST

## 2024-03-27 PROCEDURE — G0378 HOSPITAL OBSERVATION PER HR: HCPCS

## 2024-03-27 PROCEDURE — 250N000013 HC RX MED GY IP 250 OP 250 PS 637: Performed by: STUDENT IN AN ORGANIZED HEALTH CARE EDUCATION/TRAINING PROGRAM

## 2024-03-27 PROCEDURE — 93306 TTE W/DOPPLER COMPLETE: CPT | Mod: 26 | Performed by: INTERNAL MEDICINE

## 2024-03-27 PROCEDURE — 999N000208 ECHOCARDIOGRAM COMPLETE

## 2024-03-27 PROCEDURE — 93272 ECG/REVIEW INTERPRET ONLY: CPT | Performed by: INTERNAL MEDICINE

## 2024-03-27 RX ORDER — METOPROLOL TARTRATE 25 MG/1
12.5 TABLET, FILM COATED ORAL 2 TIMES DAILY
Qty: 15 TABLET | Refills: 0 | Status: SHIPPED | OUTPATIENT
Start: 2024-03-27 | End: 2024-05-24

## 2024-03-27 RX ADMIN — TAMSULOSIN HYDROCHLORIDE 0.4 MG: 0.4 CAPSULE ORAL at 09:16

## 2024-03-27 RX ADMIN — DESVENLAFAXINE 50 MG: 50 TABLET, FILM COATED, EXTENDED RELEASE ORAL at 09:17

## 2024-03-27 RX ADMIN — METOPROLOL TARTRATE 12.5 MG: 25 TABLET, FILM COATED ORAL at 09:16

## 2024-03-27 RX ADMIN — HUMAN ALBUMIN MICROSPHERES AND PERFLUTREN 9 ML: 10; .22 INJECTION, SOLUTION INTRAVENOUS at 13:39

## 2024-03-27 ASSESSMENT — ACTIVITIES OF DAILY LIVING (ADL)
ADLS_ACUITY_SCORE: 33
ADLS_ACUITY_SCORE: 34
ADLS_ACUITY_SCORE: 33
ADLS_ACUITY_SCORE: 34
ADLS_ACUITY_SCORE: 33
ADLS_ACUITY_SCORE: 34
ADLS_ACUITY_SCORE: 33
ADLS_ACUITY_SCORE: 34
ADLS_ACUITY_SCORE: 34

## 2024-03-27 NOTE — PLAN OF CARE
Observation goals  PRIOR TO DISCHARGE        Comments: -diagnostic tests and consults completed and resulted  Not Met---Needs ECHO  -vital signs normal or at patient baseline  Not Met---dizziness needs to be resloved  -safe disposition plan has been identified  Met  Nurse to notify provider when observation goals have been met and patient is ready for discharge.      Yes

## 2024-03-27 NOTE — PLAN OF CARE
Pt A&Ox4; VSS except for occasional bradycardia in the 50s. Denies any dizziness or lightheadedness this shift. Tele SR/SB. Echo done. Up independently. Tolerating PO; voiding without difficulties. Discharge orders received; instructions reviewed with pt/spouse; all questions answered; a copy of discharge AVS and meds given to pt; cardiac event monitor applied by EKG staff. All belongings returned to pt. Pt discharged to home accompanied by family.

## 2024-03-27 NOTE — H&P
"[facility]    History and Physical - Hospitalist Service       Date of Admission:  3/26/2024    Assessment & Plan      Eleno Grant is a 68 year old male admitted to observation for new onset Afib RVR with history of syncope.    Syncope with collapse  Afib with RVR-new  Patient with recent syncopal episode while on vacation. Unclear etiology but it was thought due to dehydration. He felt the same today but sat down and did not actually pass out. Presented to the ED due to his symptoms where HR found to be 150s and Afib. Prior to any intervention he self converted to NSR. Denies palpitations.    - admit to observation. Follow on tele  - obtain TTE  - start metoprolol 12.5mg for now  - chads vasc 1 currently without hx of HTN or CHF. Did not discuss anticoagulation with patient however he has a brother with Afib and is aware of the stroke risk  - check TSH    CINDY  - given fluid bolus in the ED  - follow AM labs    Anxiety  HLD  -home meds resumed as indicated        Diet:  regular  DVT Prophylaxis: Pneumatic Compression Devices  Plummer Catheter: Not present  Lines: None     Cardiac Monitoring: None  Code Status:  FULL    Clinically Significant Risk Factors Present on Admission                       # Overweight: Estimated body mass index is 28.13 kg/m  as calculated from the following:    Height as of this encounter: 1.727 m (5' 8\").    Weight as of this encounter: 83.9 kg (185 lb).              Disposition Plan      Expected Discharge Date: 03/27/2024                  Ariane Agudelo DO  Hospitalist Service  [facility]  Securely message with [employer] (more info)  Text page via [employer] [place] Paging/Directory     ______________________________________________________________________    Chief Complaint   Syncope and dizziness    History is obtained from the patient and ER provider.    History of Present Illness     Upon my assessment patient is lying comfortably in ED bed. " He really denies any complaints. He reports not drinking a lot of water at baseline and questions how  much that has played into things. He denies CHF history. He is pretty active at home doing chores and playing with his dog. He denies chest pain. No SOB.       Past Medical History    No past medical history on file.    Past Surgical History   No past surgical history on file.    Prior to Admission Medications   Prior to Admission Medications   Prescriptions Last Dose Informant Patient Reported? Taking?   atorvastatin (LIPITOR) 80 MG tablet   Yes No   Sig: Take 80 mg by mouth   co-enzyme Q-10 30 MG/5ML LIQD liquid   Yes No   Si mg   methocarbamol (ROBAXIN) 500 MG tablet   No No   Si-2 tabs q TID PRN for muscle spasm/pain   oxyCODONE-acetaminophen (PERCOCET) 5-325 MG tablet   No No   Sig: Take 1-2 tablets by mouth every 4 hours as needed for pain   sertraline (ZOLOFT) 100 MG tablet   Yes No   Sig: Take 1 tablet by mouth      Facility-Administered Medications: None        Review of Systems    The 10 point Review of Systems is negative other than noted in the HPI or here.      Physical Exam   Vital Signs: Temp: 98  F (36.7  C) Temp src: Oral BP: (!) 135/95 Pulse: 78   Resp: 12 SpO2: 99 % O2 Device: None (Room air)    Weight: 185 lbs 0 oz    Constitutional: Awake, alert, cooperative, no apparent distress.  Eyes: Conjunctiva and pupils examined and normal.  HEENT: Moist mucous membranes, normal dentition.  Respiratory: Clear to auscultation bilaterally, no crackles or wheezing.  Cardiovascular: Regular rate and rhythm, normal S1 and S2, and no murmur noted.  GI: Soft, non-distended, non-tender, normal bowel sounds..  Skin: No rashes, no cyanosis, no edema.  Musculoskeletal: No joint swelling, erythema or tenderness.  Neurologic: Cranial nerves 2-12 intact, normal strength and sensation.  Psychiatric: Alert, oriented to person, place and time, no obvious anxiety or depression.     Medical Decision Making        75 MINUTES SPENT BY ME on the date of service doing chart review, history, exam, documentation & further activities per the note.      Data     I have personally reviewed the following data over the past 24 hrs:    7.1  \   15.0   / 201     140 105 17.9 /  98   5.3 27 1.29 (H) \     ALT: N/A AST: 38 AP: 70 TBILI: 0.4   ALB: 4.2 TOT PROTEIN: 6.8 LIPASE: N/A       Imaging results reviewed over the past 24 hrs:   Recent Results (from the past 24 hour(s))   XR Chest 2 Views    Narrative    EXAM: XR CHEST 2 VIEWS  LOCATION: [facility]  DATE: 3/26/2024    INDICATION: Shortness of breath  COMPARISON: 06/01/2020      Impression    IMPRESSION: Negative chest.

## 2024-03-27 NOTE — DISCHARGE SUMMARY
"[facility]  Hospitalist Discharge Summary      Date of Admission:  3/26/2024  Date of Discharge:  3/27/2024  Discharging Provider: Clifton De Leon MD  Discharge Service: Hospitalist Service    Discharge Diagnoses   Atrial fibrillation with rapid ventricular rate    Dizziness due to above   Elevated creatinine, acute kidney injury versus chronic kidney disease     History of   Hyperlipidemia   Anxiety     Clinically Significant Risk Factors     # Overweight: Estimated body mass index is 29.3 kg/m  as calculated from the following:    Height as of this encounter: 1.727 m (5' 8\").    Weight as of this encounter: 87.4 kg (192 lb 11.2 oz).       Follow-ups Needed After Discharge   Follow-up Appointments     Follow-up and recommended labs and tests       Follow up with primary care provider, Logan Miller, within 7 days   for hospital follow- up.  The following labs/tests are recommended:   complete blood count and basic metabolic profile. 14 days heart monitor at   discharge. Follow up in cardiology clinic in 4-6 weeks.            Unresulted Labs Ordered in the Past 30 Days of this Admission       No orders found for last 31 day(s).            Discharge Disposition   Discharged to home  Condition at discharge: Stable    Hospital Course   Eleno Grant is a 68 year old male admitted to observation for new onset atrial fibrillation with rapid ventricular rate.    Atrial fibrillation with rapid ventricular rate    Dizziness due to above   Patient with recent syncopal episode while on vacation. Unclear etiology but it was thought due to dehydration. He felt the same today but sat down and did not actually pass out. Presented to the ED due to his symptoms where HR found to be 150s and Afib. Prior to any intervention he self converted to NSR. Denies palpitations.    Transthoracic echocardiogram   Technically difficult study limited views obtained due to body habitus  The visual " ejection fraction is estimated at 55%.  Left ventricular diastolic function is normal.  Lateral wall motion lower limits of normal, remainder of LV wall motion is  normal  Sinus rhythm was noted.    [id number] = 1 but for age so anticoagulation was recommended to patient.    Discharge on apixaban and metoprolol  ZioPatch   Follow up in cardiology clinic     Acute kidney injury versus chronic kidney disease   Recent Labs   Lab 03/27/24  0647 03/26/24  1616   CR 1.26* 1.29*   Follow up with primary care provider for repeat labs    Anxiety  Hyperlipidemia   Continue prior to admission medications     Consultations This Hospital Stay   PHARMACY IP CONSULT    Code Status   Full Code    Time Spent on this Encounter   I, Clifton De Leon MD, personally saw the patient today and spent greater than 30 minutes discharging this patient.       Clifton De Leon MD  [facility]  [address] [place] [id number]  Phone: [phone]  ______________________________________________________________________    Physical Exam   Vital Signs: Temp: 98.3  F (36.8  C) Temp src: Oral BP: 127/88 Pulse: 65   Resp: 18 SpO2: 95 % O2 Device: None (Room air)    Weight: 192 lbs 11.2 oz  Constitutional: awake, alert, cooperative, no apparent distress  Respiratory: No increased work of breathing, good air exchange, clear to auscultation bilaterally, no crackles or wheezing  Cardiovascular: regular rate and rhythm, normal S1 and S2, no S3 or S4, and no murmur noted  Neuropsychiatric: General: normal, calm and normal eye contact        Primary Care Physician   Logan Miller    Discharge Orders      Sleep Study Referral      Adult Cardiology Marcela Santoro Referral      Reason for your hospital stay    Atrial fibrillation     Follow-up and recommended labs and tests     Follow up with primary care provider, Logan Miller, within 7 days for hospital follow- up.  The  following labs/tests are recommended: complete blood count and basic metabolic profile. 14 days heart monitor at discharge. Follow up in cardiology clinic in 4-6 weeks.     Activity    Your activity upon discharge: activity as tolerated     Diet    Follow this diet upon discharge: Orders Placed This Encounter      Regular Diet Adult       Significant Results and Procedures   Most Recent 3 CBC's:  Recent Labs   Lab Test 24  1616 21  1200 20  1734   WBC 7.1 7.7 5.7   HGB 15.0 14.8 14.5   MCV 90 90 90    210 194     Most Recent 3 BMP's:  Recent Labs   Lab Test 24  0647 24  1616 21  1200    140 140   POTASSIUM 4.6 5.3 4.4   CHLORIDE 109* 105 111*   CO2 26 27 28   BUN 14.3 17.9 17   CR 1.26* 1.29* 1.03   ANIONGAP 8 8 1*   CAROLINE 8.8 9.6 8.6   GLC 90 98 105*     Most Recent 3 Troponin's:  Recent Labs   Lab Test 20  1734   TROPI <0.015     Most Recent TSH and T4:  Recent Labs   Lab Test 24  1939   TSH 1.58   ,   Results for orders placed or performed during the hospital encounter of 24   XR Chest 2 Views    Narrative    EXAM: XR CHEST 2 VIEWS  LOCATION: [facility]  DATE: 3/26/2024    INDICATION: Shortness of breath  COMPARISON: 2020      Impression    IMPRESSION: Negative chest.   Echocardiogram Complete     Value    LVEF  55%    Narrative    [id number]  [id number]  [id number]  [id number]^ANGELLA^SUDARSHAN^E     [facility]  Echocardiography Laboratory  [address] [place] [address]     Name: ARTURO KERR  MRN: [id number]  : 1955  Study Date: 2024 01:14 PM  Age: 68 yrs  Gender: Male  Patient Location: [facility]  Reason For Study: Aflutter  Ordering Physician: SUDARSHAN PERALES  Referring Physician: SUDARSHAN PERALES  Performed By: Homer Quinn     BSA: 2.4 m2  Height: 68 in  Weight: 290 lb  HR: 78  BP: 121/68  mmHg  ______________________________________________________________________________  Procedure  Complete Echo Adult. Optison (NDC #2398-1950) given intravenously.  ______________________________________________________________________________  Interpretation Summary     Technically difficult study limited views obtained due to body habitus  The visual ejection fraction is estimated at 55%.  Left ventricular diastolic function is normal.  Lateral wall motion lower limits of normal, remainder of LV wall motion is  normal  Sinus rhythm was noted.  ______________________________________________________________________________  Left Ventricle  The left ventricle is normal in size. There is normal left ventricular wall  thickness. The visual ejection fraction is estimated at 55%. Left ventricular  diastolic function is normal. Lateral wall motion lower limits of normal,  remainder of LV wall motion is normal. There is no thrombus seen in the left  ventricle.     Right Ventricle  The right ventricle is normal in size and function.     Atria  Normal left atrial size. Right atrial size is normal.     Mitral Valve  The mitral valve leaflets appear normal. There is no evidence of stenosis,  fluttering, or prolapse.     Tricuspid Valve  There is physiologic tricuspid regurgitation. Doppler findings do not suggest  pulmonary hypertension. IVC diameter <2.1 cm collapsing >50% with sniff  suggests a normal RA pressure of 3 mmHg.     Aortic Valve  The aortic valve is trileaflet. No hemodynamically significant valvular aortic  stenosis.     Pulmonic Valve  The pulmonic valve is not well seen, but is grossly normal.     Vessels  The aortic root is normal size.     Pericardium  The pericardium appears normal.     Rhythm  Sinus rhythm was noted.  ______________________________________________________________________________  MMode/2D Measurements & Calculations  IVSd: 0.94 cm     LVIDd: 5.1 cm  LVIDs: 3.7 cm  LVPWd: 0.99 cm  FS: 27.2  %  LV mass(C)d: 181.4 grams  LV mass(C)dI: 75.8 grams/m2  Ao root diam: 3.5 cm  LA dimension: 3.1 cm  asc Aorta Diam: 3.6 cm  LA/Ao: 0.90  LVOT diam: 2.0 cm  LVOT area: 3.0 cm2  Ao root diam index Ht(cm/m): 2.0  Ao root diam index BSA (cm/m2): 1.5  Asc Ao diam index BSA (cm/m2): 1.5  Asc Ao diam index Ht(cm/m): 2.1  LA Volume (BP): 46.3 ml     LA Volume Index (BP): 19.4 ml/m2  RV Base: 3.4 cm  RWT: 0.38  TAPSE: 2.2 cm     Doppler Measurements & Calculations  MV E max benjamin: 63.8 cm/sec  MV A max benjamin: 81.4 cm/sec  MV E/A: 0.78  MV dec time: 0.19 sec  Ao V2 max: 141.7 cm/sec  Ao max P.0 mmHg  Ao V2 mean: 96.3 cm/sec  Ao mean P.3 mmHg  Ao V2 VTI: 27.1 cm  FLORENCIO(I,D): 2.3 cm2  FLORENCIO(V,D): 2.3 cm2  LV V1 max P.8 mmHg  LV V1 max: 110.0 cm/sec  LV V1 VTI: 21.3 cm  SV(LVOT): 63.7 ml  SI(LVOT): 26.6 ml/m2  PA acc time: 0.10 sec  TR max benjamin: 218.0 cm/sec  TR max P.0 mmHg  AV Benjamin Ratio (DI): 0.78  FLORENCIO Index (cm2/m2): 0.98     E/E' av.9  Lateral E/e': 5.5  Medial E/e': 6.2  RV S Benjamin: 13.7 cm/sec     ______________________________________________________________________________  Report approved by: Becky Agee 2024 02:24 PM             Discharge Medications   Current Discharge Medication List        START taking these medications    Details   apixaban ANTICOAGULANT (ELIQUIS) 5 MG tablet Take 1 tablet (5 mg) by mouth 2 times daily  Qty: 60 tablet, Refills: 0    Associated Diagnoses: Atrial fibrillation with RVR (H)      metoprolol tartrate (LOPRESSOR) 25 MG tablet Take 0.5 tablets (12.5 mg) by mouth 2 times daily  Qty: 15 tablet, Refills: 0    Associated Diagnoses: Atrial fibrillation with RVR (H)           CONTINUE these medications which have NOT CHANGED    Details   atorvastatin (LIPITOR) 80 MG tablet Take 40 mg by mouth daily      desvenlafaxine (PRISTIQ) 50 MG 24 hr tablet Take 50 mg by mouth daily      ezetimibe (ZETIA) 10 MG tablet Take 10 mg by mouth daily      tamsulosin (FLOMAX) 0.4 MG  capsule Take 0.4 mg by mouth daily           Allergies   No Known Allergies

## 2024-03-27 NOTE — PHARMACY-ADMISSION MEDICATION HISTORY
Pharmacist Admission Medication History    Admission medication history is complete. The information provided in this note is only as accurate as the sources available at the time of the update.    Information Source(s): Patient and CareEverywhere/SureScripts via in-person    Changes made to PTA medication list:  Added: desvenlafaxine, zetia, tamsulosin  Deleted: sertraline, oxycodone, robaxin, CoQ10  Changed: atorva 80 mg > 40 mg     Allergies reviewed with patient and updates made in EHR: yes    Medication History Completed By: Malina Lao RPH 3/26/2024 7:52 PM    PTA Med List   Medication Sig Last Dose    atorvastatin (LIPITOR) 80 MG tablet Take 40 mg by mouth daily     desvenlafaxine (PRISTIQ) 50 MG 24 hr tablet Take 50 mg by mouth daily     ezetimibe (ZETIA) 10 MG tablet Take 10 mg by mouth daily     tamsulosin (FLOMAX) 0.4 MG capsule Take 0.4 mg by mouth daily

## 2024-03-27 NOTE — PLAN OF CARE
"PRIMARY Concern: syncope  SAFETY RISK Concerns (fall risk, behaviors, etc.): fall risk/bed alarm, fall band      Isolation/Type: No  Tests/Procedures for NEXT shift: ECHO  Consults? (Pending/following, signed-off?) No  Where is patient from? (Home, TCU, etc.): Home  Other Important info for NEXT shift: ECHO  Anticipated DC date & active delays: TBD  _____________________________________________________________________________  SUMMARY NOTE:             Orientation/Cognitive: Alert and oriented x4  Observation Goals (Met/ Not Met): Partially met  Mobility Level/Assist Equipment: SBA  Antibiotics & Plan (IV/po, length of tx left): No  Pain Management: slight headache but declined offered for pain medication  Tele/VS/O2: VSS/RA/tele  ABNL Lab/BG: am labs  Diet: regular diet  Bowel/Bladder: voiding  Skin Concerns:  skin flushed/tan from [place] vacation  Drains/Devices: No  Patient Stated Goal for Today: \"sleep\"                         "

## 2024-03-27 NOTE — PROGRESS NOTES
.RECEIVING UNIT ED HANDOFF REVIEW    ED Nurse Handoff Report was reviewed by: Josue Morrow RN on March 26, 2024 at 8:54 PM

## 2024-03-27 NOTE — PROGRESS NOTES
Observation goals  PRIOR TO DISCHARGE       Comments: -diagnostic tests and consults completed and resulted  Not Met--- Echo pending   -vital signs normal or at patient baseline -Met  -safe disposition plan has been identified  Met  Nurse to notify provider when observation goals have been met and patient is ready for discharge.

## 2024-03-27 NOTE — PROGRESS NOTES
Admission/Transfer from: ED  2 RN skin assessment completed. Yes  Significant findings include:   No  WOC Nurse Consult Ordered? No

## 2024-03-28 NOTE — PROGRESS NOTES
Observation goals  PRIOR TO DISCHARGE       Comments: -diagnostic tests and consults completed and resulted: Met  -vital signs normal or at patient baseline -Met  -safe disposition plan has been identified  Met  Nurse to notify provider when observation goals have been met and patient is ready for discharge.

## 2024-04-24 ENCOUNTER — OFFICE VISIT (OUTPATIENT)
Dept: CARDIOLOGY | Facility: CLINIC | Age: 69
End: 2024-04-24
Attending: HOSPITALIST
Payer: COMMERCIAL

## 2024-04-24 VITALS
HEART RATE: 60 BPM | OXYGEN SATURATION: 100 % | HEIGHT: 68 IN | SYSTOLIC BLOOD PRESSURE: 109 MMHG | DIASTOLIC BLOOD PRESSURE: 73 MMHG | WEIGHT: 194.1 LBS | BODY MASS INDEX: 29.42 KG/M2

## 2024-04-24 DIAGNOSIS — E78.2 MIXED HYPERLIPIDEMIA: ICD-10-CM

## 2024-04-24 DIAGNOSIS — R93.1 ELEVATED CORONARY ARTERY CALCIUM SCORE: ICD-10-CM

## 2024-04-24 DIAGNOSIS — R00.1 SINUS BRADYCARDIA: ICD-10-CM

## 2024-04-24 DIAGNOSIS — R55 SYNCOPE, UNSPECIFIED SYNCOPE TYPE: Primary | ICD-10-CM

## 2024-04-24 DIAGNOSIS — I48.91 ATRIAL FIBRILLATION WITH RVR (H): ICD-10-CM

## 2024-04-24 PROCEDURE — 99204 OFFICE O/P NEW MOD 45 MIN: CPT | Performed by: INTERNAL MEDICINE

## 2024-04-24 PROCEDURE — 93000 ELECTROCARDIOGRAM COMPLETE: CPT | Performed by: INTERNAL MEDICINE

## 2024-04-24 NOTE — LETTER
4/24/2024    Logan Miller MD  [address]  [facility] [address]    RE: Eleno Grant       Dear Colleague,     I had the pleasure of seeing Eleno Grant in the [facility].  HPI and Plan:   lEeno is a very nice 68-year-old gentleman referred from the ER after an episode of rapid atrial fibrillation and syncope.    Eleno is a family history of his father receiving a pacemaker defibrillator.  His paternal grandfather had a cerebrovascular accident.  He has a brother who also has a pacemaker.    Eleno has hypercholesterolemia.  Calcium score from September 2023 at 249 putting him in the 66th percentile.  He smoked a bit in his teenage years and 20s but have not smoked in 45 years.  He does not have diabetes mellitus or hypertension.  He does have treated hypercholesterolemia.  He states he can drink anywhere from 1-7 alcoholic drinks in a week's time.  Most days of the week he is not drinking at all.    Patient relates in early March having an episode of lightheadedness dizziness syncope and head trauma while in [place].  He had had 2 drinks that day.  He was evaluated in the ER for his head trauma and released.  After returning to the [facility] he had an episode of severe lightheadedness and dizziness.  He felt like he was going to pass out again in retrospect he thinks he may have had some vague chest discomfort.  His wife drove him to the ER was found to be in rapid atrial fibrillation.  In the ER he spontaneously converted back to sinus rhythm.  He has been started on metoprolol tartrate 12.5 mg twice daily.  He states he feels fatigued and tired but has not had any further episodes of A-fib to his knowledge.  He was prescribed a 30-day event monitor which only stayed on about 10 days but this demonstrated no recurrent A-fib.  He was wearing a smart watch but oftentimes got readings in the 40s and this unnerved him so he is not wearing his smart watch  anymore.    In retrospect he recalls a near syncopal spell about 2 years ago after drinking some alcohol.    Twelve-lead EKG today demonstrates sinus bradycardia 51 bpm.    March 2023 echocardiogram demonstrates ejection fraction of 55% without valvular pathology.    Normally is quite active doing all activities around the house and yard and exercising without any limitations or symptoms.  He does snore at nighttime and is being set up for a sleep study.  Lab work in the ER demonstrates a normal BMP, thyroid function test and CBC.    Assessment and plan.  Eleno clearly has paroxysmal atrial fibrillation.  He does not tolerate it very well and has very little room to move for medical management.  I talked about the fact that I suspect he is headed towards a pacemaker to create a floor so we can treat his tachyarrhythmia because he has sick sinus syndrome/tachybradycardia syndrome.    I do not think he has underlying coronary artery disease but to be complete I will set him up for a stress echo.    He states he is headed for herniorrhaphy next week which I told him I think he can proceed with.  He will have to stop his Eliquis 2 to 3 days prior to his surgery.  I suspect we will have to wait a couple of weeks or more before we can do a stress echo.    I will have him see EP as I suspect we are going to need a pacemaker for optimal management.  He does not tolerate the rapid A-fib and I am not sure metoprolol tartrate 12.5 mg is going to be adequate.    He is tolerating the Eliquis we talked about the lifelong need for anticoagulation.    His calcium score puts him at above average risk and I would aggressively treat risk factors.  His most recent fasting lipid profile through the [facility] system from earlier this month shows a total cholesterol 178, HDL 71, LDL 94 and triglycerides of 66.  He is still primary prevention so this is adequate.  However he is tolerating medicines quite well.  He reports that he is being  switched from atorvastatin to rosuvastatin 40.  He is already on Zetia 10.  We will see what his fasting lipid profile is with this combination.  We talked about the importance of a predominantly plant-based diet and regular exercise.  We will follow this up more  intensely down the road      Further evaluation and treatment depend about above results..Thank you for allow me to participate in this patient's care.  Sincerely,                               Farrukh Bates MD [facility]          Today's clinic visit entailed:  Review of external notes as documented elsewhere in note  Review of the result(s) of each unique test - EKG, calcium score, echocardiogram, lab work  The following tests were independently interpreted by me as noted in my documentation: EKG  Ordering of each unique test  Prescription drug management  45 minutes spent by me on the date of the encounter doing chart review, history and exam, documentation and further activities per the note  Provider  Link to [facility] Help Grid     The level of medical decision making during this visit was of moderate complexity.      Orders Placed This Encounter   Procedures    Basic metabolic panel    Lipid Profile    ALT    Follow-Up with Cardiology    Follow-Up with Cardiology    EKG 12-lead complete w/read - Clinics (performed today)    Exercise Stress Echocardiogram       No orders of the defined types were placed in this encounter.      There are no discontinued medications.      Encounter Diagnoses   Name Primary?    Atrial fibrillation with RVR (H)     Syncope, unspecified syncope type Yes    Sinus bradycardia     Mixed hyperlipidemia     Elevated coronary artery calcium score        CURRENT MEDICATIONS:  Current Outpatient Medications   Medication Sig Dispense Refill    apixaban ANTICOAGULANT (ELIQUIS) 5 MG tablet Take 1 tablet (5 mg) by mouth 2 times daily 60 tablet 0    atorvastatin (LIPITOR) 80 MG tablet Take 40 mg by mouth daily      desvenlafaxine (PRISTIQ) 50  MG 24 hr tablet Take 50 mg by mouth daily      ezetimibe (ZETIA) 10 MG tablet Take 10 mg by mouth daily      metoprolol tartrate (LOPRESSOR) 25 MG tablet Take 0.5 tablets (12.5 mg) by mouth 2 times daily 15 tablet 0    tamsulosin (FLOMAX) 0.4 MG capsule Take 0.4 mg by mouth daily         ALLERGIES   No Known Allergies    PAST MEDICAL HISTORY:  No past medical history on file.    PAST SURGICAL HISTORY:  No past surgical history on file.    FAMILY HISTORY:  No family history on file.    SOCIAL HISTORY:  Social History     Socioeconomic History    Marital status:      Social Determinants of Health     Financial Resource Strain: Low Risk  (8/29/2023)    [facility]    Financial Resource Strain     Difficulty of Paying Living Expenses: 3   Food Insecurity: No Food Insecurity (8/29/2023)    [facility], [facility]    Food Insecurity     Worried About Running Out of Food in the Last Year: 1   Transportation Needs: No Transportation Needs (8/29/2023)    [facility]    Transportation Needs     Lack of Transportation (Medical): 1   Social Connections: Socially Integrated (8/29/2023)    [facility]    Social Connections     Frequency of Communication with Friends and Family: 0   Housing Stability: Low Risk  (8/29/2023)    [facility]    Housing Stability     Unable to Pay for Housing in the Last Year: 1       Review of Systems:  Skin:  Negative rash;bruising;lumps or bumps     Eyes:  Negative visual blurring;double vision    ENT:  Negative tinnitus;vertigo    Respiratory:   "Negative shortness of breath     Cardiovascular:  Negative;chest pain;edema Positive for;palpitations;fatigue;lightheadedness;dizziness    Gastroenterology: Negative poor appetite;nausea;vomiting    Genitourinary:  Negative urinary frequency;urgency;hesitancy    Musculoskeletal:  Negative gout;fibromyalgia    Neurologic:  Negative migraine headaches;headaches;numbness or tingling of hands;numbness or tingling of feet    Psychiatric:  Negative excessive stress;sleep disturbances    Heme/Lymph/Imm:  Negative anemia;bleeding disorder    Endocrine:  Negative thyroid disorder;diabetes      Physical Exam:  Vitals: /73 (BP Location: Left arm, Patient Position: Sitting)   Pulse 60   Ht 1.727 m (5' 8\")   Wt 88 kg (194 lb 1.6 oz)   SpO2 100%   BMI 29.51 kg/m      Constitutional:  cooperative, alert and oriented, well developed, well nourished, in no acute distress        Skin:  warm and dry to the touch, no apparent skin lesions or masses noted          Head:  normocephalic, no masses or lesions        Eyes:  pupils equal and round, conjunctivae and lids unremarkable, sclera white, no xanthalasma, EOMS intact, no nystagmus        Lymph:      ENT:  no pallor or cyanosis, dentition good        Neck:  no carotid bruit        Respiratory:  normal breath sounds, clear to auscultation, normal A-P diameter, normal symmetry, normal respiratory excursion, no use of accessory muscles         Cardiac: regular rhythm;no murmurs, gallops or rubs detected                pulses full and equal                                        GI:           Extremities and Muscular Skeletal:  no edema;no spinal abnormalities noted;normal muscle strength and tone              Neurological:  no gross motor deficits        Psych:  affect appropriate, oriented to time, person and place        CC  Clifton De Leon MD  [id number] [id number]      Thank you for allowing me to participate in the care of your " patient.      Sincerely,     Farrukh Bates MD     [facility]

## 2024-04-24 NOTE — PROGRESS NOTES
HPI and Plan:   Eleno is a very nice 68-year-old gentleman referred from the ER after an episode of rapid atrial fibrillation and syncope.    Eleno is a family history of his father receiving a pacemaker defibrillator.  His paternal grandfather had a cerebrovascular accident.  He has a brother who also has a pacemaker.    Eleno has hypercholesterolemia.  Calcium score from September 2023 at 249 putting him in the 66th percentile.  He smoked a bit in his teenage years and 20s but have not smoked in 45 years.  He does not have diabetes mellitus or hypertension.  He does have treated hypercholesterolemia.  He states he can drink anywhere from 1-7 alcoholic drinks in a week's time.  Most days of the week he is not drinking at all.    Patient relates in early March having an episode of lightheadedness dizziness syncope and head trauma while in [place].  He had had 2 drinks that day.  He was evaluated in the ER for his head trauma and released.  After returning to the [facility] he had an episode of severe lightheadedness and dizziness.  He felt like he was going to pass out again in retrospect he thinks he may have had some vague chest discomfort.  His wife drove him to the ER was found to be in rapid atrial fibrillation.  In the ER he spontaneously converted back to sinus rhythm.  He has been started on metoprolol tartrate 12.5 mg twice daily.  He states he feels fatigued and tired but has not had any further episodes of A-fib to his knowledge.  He was prescribed a 30-day event monitor which only stayed on about 10 days but this demonstrated no recurrent A-fib.  He was wearing a smart watch but oftentimes got readings in the 40s and this unnerved him so he is not wearing his smart watch anymore.    In retrospect he recalls a near syncopal spell about 2 years ago after drinking some alcohol.    Twelve-lead EKG today demonstrates sinus bradycardia 51 bpm.    March 2023 echocardiogram demonstrates ejection fraction  of 55% without valvular pathology.    Normally is quite active doing all activities around the house and yard and exercising without any limitations or symptoms.  He does snore at nighttime and is being set up for a sleep study.  Lab work in the ER demonstrates a normal BMP, thyroid function test and CBC.    Assessment and plan.  Eleno clearly has paroxysmal atrial fibrillation.  He does not tolerate it very well and has very little room to move for medical management.  I talked about the fact that I suspect he is headed towards a pacemaker to create a floor so we can treat his tachyarrhythmia because he has sick sinus syndrome/tachybradycardia syndrome.    I do not think he has underlying coronary artery disease but to be complete I will set him up for a stress echo.    He states he is headed for herniorrhaphy next week which I told him I think he can proceed with.  He will have to stop his Eliquis 2 to 3 days prior to his surgery.  I suspect we will have to wait a couple of weeks or more before we can do a stress echo.    I will have him see EP as I suspect we are going to need a pacemaker for optimal management.  He does not tolerate the rapid A-fib and I am not sure metoprolol tartrate 12.5 mg is going to be adequate.    He is tolerating the Eliquis we talked about the lifelong need for anticoagulation.    His calcium score puts him at above average risk and I would aggressively treat risk factors.  His most recent fasting lipid profile through the [facility] system from earlier this month shows a total cholesterol 178, HDL 71, LDL 94 and triglycerides of 66.  He is still primary prevention so this is adequate.  However he is tolerating medicines quite well.  He reports that he is being switched from atorvastatin to rosuvastatin 40.  He is already on Zetia 10.  We will see what his fasting lipid profile is with this combination.  We talked about the importance of a predominantly plant-based diet and regular  exercise.  We will follow this up more  intensely down the road      Further evaluation and treatment depend about above results..Thank you for allow me to participate in this patient's care.  Sincerely,                               Farrukh Bates MD [facility]          Today's clinic visit entailed:  Review of external notes as documented elsewhere in note  Review of the result(s) of each unique test - EKG, calcium score, echocardiogram, lab work  The following tests were independently interpreted by me as noted in my documentation: EKG  Ordering of each unique test  Prescription drug management  45 minutes spent by me on the date of the encounter doing chart review, history and exam, documentation and further activities per the note  Provider  Link to [facility] Help Grid     The level of medical decision making during this visit was of moderate complexity.      Orders Placed This Encounter   Procedures    Basic metabolic panel    Lipid Profile    ALT    Follow-Up with Cardiology    Follow-Up with Cardiology    EKG 12-lead complete w/read - Clinics (performed today)    Exercise Stress Echocardiogram       No orders of the defined types were placed in this encounter.      There are no discontinued medications.      Encounter Diagnoses   Name Primary?    Atrial fibrillation with RVR (H)     Syncope, unspecified syncope type Yes    Sinus bradycardia     Mixed hyperlipidemia     Elevated coronary artery calcium score        CURRENT MEDICATIONS:  Current Outpatient Medications   Medication Sig Dispense Refill    apixaban ANTICOAGULANT (ELIQUIS) 5 MG tablet Take 1 tablet (5 mg) by mouth 2 times daily 60 tablet 0    atorvastatin (LIPITOR) 80 MG tablet Take 40 mg by mouth daily      desvenlafaxine (PRISTIQ) 50 MG 24 hr tablet Take 50 mg by mouth daily      ezetimibe (ZETIA) 10 MG tablet Take 10 mg by mouth daily      metoprolol tartrate (LOPRESSOR) 25 MG tablet Take 0.5 tablets (12.5 mg) by mouth 2 times daily 15 tablet 0     tamsulosin (FLOMAX) 0.4 MG capsule Take 0.4 mg by mouth daily         ALLERGIES   No Known Allergies    PAST MEDICAL HISTORY:  No past medical history on file.    PAST SURGICAL HISTORY:  No past surgical history on file.    FAMILY HISTORY:  No family history on file.    SOCIAL HISTORY:  Social History     Socioeconomic History    Marital status:      Social Determinants of Health     Financial Resource Strain: Low Risk  (8/29/2023)    [facility]    Financial Resource Strain     Difficulty of Paying Living Expenses: 3   Food Insecurity: No Food Insecurity (8/29/2023)    Received from [facility]    Food Insecurity     Worried About Running Out of Food in the Last Year: 1   Transportation Needs: No Transportation Needs (8/29/2023)    Received from [facility]    Transportation Needs     Lack of Transportation (Medical): 1   Social Connections: Socially Integrated (8/29/2023)    Received from [facility]    Social Connections     Frequency of Communication with Friends and Family: 0   Housing Stability: Low Risk  (8/29/2023)    [facility]    Housing Stability     Unable to Pay for Housing in the Last Year: 1       Review of Systems:  Skin:  Negative rash;bruising;lumps or bumps     Eyes:  Negative visual blurring;double vision    ENT:  Negative tinnitus;vertigo    Respiratory:  Negative shortness of breath     Cardiovascular:  Negative;chest pain;edema Positive for;palpitations;fatigue;lightheadedness;dizziness    Gastroenterology: Negative poor appetite;nausea;vomiting    Genitourinary:   "Negative urinary frequency;urgency;hesitancy    Musculoskeletal:  Negative gout;fibromyalgia    Neurologic:  Negative migraine headaches;headaches;numbness or tingling of hands;numbness or tingling of feet    Psychiatric:  Negative excessive stress;sleep disturbances    Heme/Lymph/Imm:  Negative anemia;bleeding disorder    Endocrine:  Negative thyroid disorder;diabetes      Physical Exam:  Vitals: /73 (BP Location: Left arm, Patient Position: Sitting)   Pulse 60   Ht 1.727 m (5' 8\")   Wt 88 kg (194 lb 1.6 oz)   SpO2 100%   BMI 29.51 kg/m      Constitutional:  cooperative, alert and oriented, well developed, well nourished, in no acute distress        Skin:  warm and dry to the touch, no apparent skin lesions or masses noted          Head:  normocephalic, no masses or lesions        Eyes:  pupils equal and round, conjunctivae and lids unremarkable, sclera white, no xanthalasma, EOMS intact, no nystagmus        Lymph:      ENT:  no pallor or cyanosis, dentition good        Neck:  no carotid bruit        Respiratory:  normal breath sounds, clear to auscultation, normal A-P diameter, normal symmetry, normal respiratory excursion, no use of accessory muscles         Cardiac: regular rhythm;no murmurs, gallops or rubs detected                pulses full and equal                                        GI:           Extremities and Muscular Skeletal:  no edema;no spinal abnormalities noted;normal muscle strength and tone              Neurological:  no gross motor deficits        Psych:  affect appropriate, oriented to time, person and place        CC  Clifton De Loen MD  [id number] [id number]                "

## 2024-05-23 ENCOUNTER — HOSPITAL ENCOUNTER (OUTPATIENT)
Dept: CARDIOLOGY | Facility: CLINIC | Age: 69
Discharge: HOME | End: 2024-05-23
Attending: INTERNAL MEDICINE | Admitting: INTERNAL MEDICINE
Payer: COMMERCIAL

## 2024-05-23 ENCOUNTER — TELEPHONE (OUTPATIENT)
Dept: CARDIOLOGY | Facility: CLINIC | Age: 69
End: 2024-05-23

## 2024-05-23 DIAGNOSIS — R55 SYNCOPE, UNSPECIFIED SYNCOPE TYPE: ICD-10-CM

## 2024-05-23 DIAGNOSIS — I48.91 ATRIAL FIBRILLATION WITH RVR (H): ICD-10-CM

## 2024-05-23 DIAGNOSIS — R00.1 SINUS BRADYCARDIA: ICD-10-CM

## 2024-05-23 PROCEDURE — 93016 CV STRESS TEST SUPVJ ONLY: CPT | Performed by: INTERNAL MEDICINE

## 2024-05-23 PROCEDURE — 93350 STRESS TTE ONLY: CPT | Mod: 26 | Performed by: INTERNAL MEDICINE

## 2024-05-23 PROCEDURE — 999N000208 ECHO STRESS ECHOCARDIOGRAM

## 2024-05-23 PROCEDURE — 93321 DOPPLER ECHO F-UP/LMTD STD: CPT | Mod: 26 | Performed by: INTERNAL MEDICINE

## 2024-05-23 PROCEDURE — 93325 DOPPLER ECHO COLOR FLOW MAPG: CPT | Mod: 26 | Performed by: INTERNAL MEDICINE

## 2024-05-23 PROCEDURE — 93018 CV STRESS TEST I&R ONLY: CPT | Performed by: INTERNAL MEDICINE

## 2024-05-23 PROCEDURE — 255N000002 HC RX 255 OP 636: Performed by: INTERNAL MEDICINE

## 2024-05-23 RX ADMIN — HUMAN ALBUMIN MICROSPHERES AND PERFLUTREN 9 ML: 10; .22 INJECTION, SOLUTION INTRAVENOUS at 09:30

## 2024-05-23 NOTE — TELEPHONE ENCOUNTER
"Stress echo routed to Dr Bates to review, ordered to assess for obstructive CAD. Ca score 9/2023 showed patient was in the 66th percentile. PCP is managing statin.     Per OV note 4/24/24- \"Eleno clearly has paroxysmal atrial fibrillation. He does not tolerate it very well and has very little room to move for medical management. I talked about the fact that I suspect he is headed towards a pacemaker to create a floor so we can treat his tachyarrhythmia because he has sick sinus syndrome/tachybradycardia syndrome.\"    He has not set up EP follow up yet.       Interpretation Summary  Normal resting heart rate and blood pressure with normal response to exercise.  No chest pain with exercise  High normal exercise capacity for age  Normal resting ECG, no ischemic changes seen with exercise.  **At 1:26 recovery the patient developed asymptomatic, regular, wide complex  tachycardia, rate 184 to 193 bpm. This is most likely SVT with aberrancy,  although VT cannot completely excluded. The arrhythmia lasted about 50  seconds, resolving spontaneously**  Normal resting LV function with good augmentation of all wall segments post  exercise.  This study showed no evidence of inducible myocardial ischemia.  [place]: Dr Cortés   "

## 2024-05-24 RX ORDER — METOPROLOL TARTRATE 25 MG/1
12.5 TABLET, FILM COATED ORAL 2 TIMES DAILY
Qty: 90 TABLET | Refills: 4 | Status: ON HOLD | OUTPATIENT
Start: 2024-05-24 | End: 2024-09-23

## 2024-05-24 NOTE — TELEPHONE ENCOUNTER
"Farrukh Bates MD  You2 hours ago (7:18 AM)     He needs to set up an appointment with the EP as recommended.  Can we get the strips for EP to review even before his appointment     Called patient to review report and message from Dr Bates. Scheduling number provided to call and set up EP consult and he will do this. He asks for a refill of his metoprolol, Rx sent along with eliquis.     He mentions he is having a procedure on 6/19 to break up a kidney stone. He is wondering if he is OK to proceed. Will check with Dr Bates. He states his hernia repair went well, no complications.     OV note 4/24 by Dr Bates-  \"He states he is headed for herniorrhaphy next week which I told him I think he can proceed with. He will have to stop his Eliquis 2 to 3 days prior to his surgery.\"   "

## 2024-05-24 NOTE — TELEPHONE ENCOUNTER
Reply from Dr. Bates:    0k to proceed.  As stated he needs to schedule an EP appointment       Patient is scheduled with Dr. Obrien in EP on 8/1/2024    1130 called patient with update that he may proceed with the planning for the lithotripsy. Patient verbalized understanding and agreed with plan.

## 2024-06-24 ENCOUNTER — TELEPHONE (OUTPATIENT)
Dept: CARDIOLOGY | Facility: CLINIC | Age: 69
End: 2024-06-24
Payer: COMMERCIAL

## 2024-06-24 NOTE — TELEPHONE ENCOUNTER
Spoke with patient. He states he had his lithotripsy last Wednesday and it went well. This week he is noticing a slight increase in his afib episodes. They are at least daily and sometimes 2x day. They last about 10 minutes with no SOB or chest discomfort.    Since the rapid rates restarted, he put his Apple watch back on and can see that his rates are 111-120 BPM. He does not have a BP cuff at home.  Patient is compliant and consistent with his lopressor 12.5mg BID and the eliquis 5mg BID.    Patient to see Dr. Obrien on 8/1/2024 to discuss long-term therapy for his afib.    Patient states he just wants to be sure he doesn't need to change anything prior to the EP visit.    Will message Dr. Bates to review

## 2024-06-24 NOTE — TELEPHONE ENCOUNTER
[facility] Call Center    Phone Message    May a detailed message be left on voicemail: yes     Reason for Call: Other: Pt called and says he is still having light headness and rapid heart beats while taking Metropolol, and  Eliquis, and wants to know what to do from here, please call pt back for further discussion.      Action Taken:  cardiolgoy    Travel Screening: Not Applicable     Date of Service:                                     Thank you!  [facility]

## 2024-06-25 NOTE — TELEPHONE ENCOUNTER
Farrukh Bates MD Anderson, Deepti PERDOMO RN17 hours ago (4:34 PM)     What is his heart rate when in sinus rhythm.  In clinic it was only 60 so I am hesitant to increase his Lopressor.  If his heart rate is 60 or greater he can try increasing his Lopressor to 25 twice daily until he sees Dr. Obrien.  Call if his blood pressures not well-controlled       Left a message for patient to call back.       Addendum 1315: Spoke to patient, says when he is in NSR it can vary anywhere from 60-90's. Reviewed option to increase metoprolol to 25mg BID. He says he is not interested in making any medication changes at this time, he moreso wanted to alert the team and understand why his is going in/out of it. He is concerned that the higher dose of metoprolol will increase his fatigue. He meet with Sleep Medicine today and needs to set up a sleep study. Recommendation patient notify the clinic with any increasing frequency/duration of his afib or if he becomes symptomatic. Reviewed nature of PAF and triggers for breakthrough afib. He verbalized understanding. Dr Bates updated.

## 2024-07-12 ENCOUNTER — LAB (OUTPATIENT)
Dept: LAB | Facility: CLINIC | Age: 69
End: 2024-07-12
Payer: COMMERCIAL

## 2024-07-12 DIAGNOSIS — E78.2 MIXED HYPERLIPIDEMIA: ICD-10-CM

## 2024-07-12 LAB
ALT SERPL W P-5'-P-CCNC: 33 U/L (ref 0–70)
ANION GAP SERPL CALCULATED.3IONS-SCNC: 9 MMOL/L (ref 7–15)
BUN SERPL-MCNC: 17.9 MG/DL (ref 8–23)
CALCIUM SERPL-MCNC: 9.6 MG/DL (ref 8.8–10.2)
CHLORIDE SERPL-SCNC: 104 MMOL/L (ref 98–107)
CHOLEST SERPL-MCNC: 135 MG/DL
CREAT SERPL-MCNC: 1.65 MG/DL (ref 0.67–1.17)
DEPRECATED HCO3 PLAS-SCNC: 27 MMOL/L (ref 22–29)
EGFRCR SERPLBLD CKD-EPI 2021: 45 ML/MIN/1.73M2
FASTING STATUS PATIENT QL REPORTED: YES
GLUCOSE SERPL-MCNC: 97 MG/DL (ref 70–99)
HDLC SERPL-MCNC: 43 MG/DL
LDLC SERPL CALC-MCNC: 74 MG/DL
NONHDLC SERPL-MCNC: 92 MG/DL
POTASSIUM SERPL-SCNC: 4.4 MMOL/L (ref 3.4–5.3)
SODIUM SERPL-SCNC: 140 MMOL/L (ref 135–145)
TRIGL SERPL-MCNC: 91 MG/DL

## 2024-07-12 PROCEDURE — 36415 COLL VENOUS BLD VENIPUNCTURE: CPT

## 2024-07-12 PROCEDURE — 84460 ALANINE AMINO (ALT) (SGPT): CPT

## 2024-07-12 PROCEDURE — 80048 BASIC METABOLIC PNL TOTAL CA: CPT

## 2024-07-12 PROCEDURE — 80061 LIPID PANEL: CPT

## 2024-07-15 NOTE — RESULT ENCOUNTER NOTE
Farrukh Bates MD ---->  P Gonzalez [facility] Heart Team 2  Fasting lipid profile is good enough for primary prevention.  Unclear why his creatinine is up.    Lio Social sent

## 2024-08-01 ENCOUNTER — OFFICE VISIT (OUTPATIENT)
Dept: CARDIOLOGY | Facility: CLINIC | Age: 69
End: 2024-08-01
Attending: INTERNAL MEDICINE
Payer: COMMERCIAL

## 2024-08-01 VITALS
RESPIRATION RATE: 18 BRPM | BODY MASS INDEX: 29.4 KG/M2 | OXYGEN SATURATION: 100 % | SYSTOLIC BLOOD PRESSURE: 118 MMHG | DIASTOLIC BLOOD PRESSURE: 78 MMHG | HEIGHT: 68 IN | WEIGHT: 194 LBS | HEART RATE: 65 BPM

## 2024-08-01 DIAGNOSIS — R00.1 SINUS BRADYCARDIA: ICD-10-CM

## 2024-08-01 DIAGNOSIS — I48.0 PAROXYSMAL ATRIAL FIBRILLATION (H): Primary | ICD-10-CM

## 2024-08-01 DIAGNOSIS — R55 SYNCOPE, UNSPECIFIED SYNCOPE TYPE: ICD-10-CM

## 2024-08-01 PROCEDURE — G2211 COMPLEX E/M VISIT ADD ON: HCPCS | Performed by: INTERNAL MEDICINE

## 2024-08-01 PROCEDURE — 99204 OFFICE O/P NEW MOD 45 MIN: CPT | Performed by: INTERNAL MEDICINE

## 2024-08-01 PROCEDURE — 93000 ELECTROCARDIOGRAM COMPLETE: CPT | Performed by: INTERNAL MEDICINE

## 2024-08-01 RX ORDER — ROSUVASTATIN CALCIUM 40 MG/1
40 TABLET, COATED ORAL DAILY
COMMUNITY
Start: 2024-06-06

## 2024-08-01 NOTE — PATIENT INSTRUCTIONS
Today's Recommendations    Schedule atrial fibrillation ablation  Continue Eliquis 5 mg twice a day -- do not hold any doses prior to the ablation  Continue metoprolol 12.5 mg twice a day      Please send Giftly message or call [phone] for the EP nurses with questions or concerns.     Scheduling and after hours number [phone]

## 2024-08-01 NOTE — LETTER
8/1/2024    Logan Miller MD  [address]  [facility] [address]    RE: Eleno Grant       Dear Colleague,     I had the pleasure of seeing Eleno Grant in the [facility].  [facility]  Cardiac Electrophysiology Clinic    Eleno Grant MRN# [id number]   YOB: 1955 Age: 69 year old       I had the pleasure of seeing Eleno Grant  who is a 69 year old male with history of hyperlipidemia, DANETTE, and paroxysmal atrial fibrillation.  He was evaluated by Dr. Bates in 4/2024 for syncope and atrial fibrillation.  The patient had an episode of syncope in 3/2024 while he was in [place].  He reported having dizziness/lightheadedness prior to passing out and did hit his head.  He was evaluated in the ED, and workup was unremarkable.  After returning to [place] he had another episode of severe lightheadedness and was evaluated in the ED again.  He was found to be in rapid atrial fibrillation and converted spontaneously.  He was started on low-dose metoprolol.  He did have a 30-day event monitor afterwards which did not show any recurrent atrial fibrillation.  His baseline EKG showed sinus bradycardia with rate of 51 bpm.      Today's Visit:  He is accompanied by his wife Olga.  He states he is still having occasional dizziness, but this has improved significantly since starting the new medication.  He did fully pass out in 3/2024 while he was in [place] and was evaluated at a trauma hospital.  His cardiac workup was negative at the time.    Prior to his ED visit in [place], he had another episode of severe dizziness and had to lay down, but did not pass out.  He does feel fluttering in his chest which he had not paid very much attention to.  He is still having occasional episodes of lightheadedness and when he checks his smart watch it will frequently say possible atrial fibrillation.  This was happening every day until  the past month.  He is now having episodes every 2 to 3 days and only lasting a few minutes.  He is also occasionally noticing slow heart rates at rest.  He recalls having a prior episode of syncope about 2 years ago after he started venlafaxine.  He attributed it to starting a new medication and having a couple of drinks with friends.  He has not had any significant bleeding issues on Eliquis.  He recently had surgery for a kidney stone and stent placement.  He tolerated the procedure well.  He has no personal history of cardiovascular disease.  He was recently evaluated in sleep medicine and diagnosed with sleep apnea.  He has not gotten his CPAP yet.  He does have significant family history of cardiovascular disease.  His dad has an ICD.  His sister has atrial fibrillation and had an ablation, and his brother also had atrial fibrillation and has a pacemaker.        Diagnostic Testing:  EKG today, which I overread, showed Sinus rhythm rate 64 bpm.  , QRS 96, QTc 415 ms.  EKG 4/24/2024-sinus bradycardia rate 51 bpm.  , QRS 96, QTc 409 ms.  EKG 3/26/2024-atrial fibrillation rate 141 bpm    Event Monitor 3/2024 - personally reviewed  Sinus rhythm with occasional PVCs  No A-fib recorded    Echocardiogram 3/27/2024    Technically difficult study limited views obtained due to body habitus  The visual ejection fraction is estimated at 55%.  Left ventricular diastolic function is normal.  Lateral wall motion lower limits of normal, remainder of LV wall motion is  normal  Sinus rhythm was noted.      Stress Testing 5/23/2024  Normal resting heart rate and blood pressure with normal response to exercise.  No chest pain with exercise  High normal exercise capacity for age  Normal resting ECG, no ischemic changes seen with exercise.  At 1:26 recovery the patient developed asymptomatic, regular, wide complex  tachycardia, rate 184 to 193 bpm. This is most likely SVT with aberrancy,  although VT cannot completely  excluded. The arrhythmia lasted about 50  seconds, resolving spontaneously  Normal resting LV function with good augmentation of all wall segments post  exercise.  This study showed no evidence of inducible myocardial ischemia.        Last Comprehensive Metabolic Panel:  Lab Results   Component Value Date     07/12/2024    POTASSIUM 4.4 07/12/2024    CHLORIDE 104 07/12/2024    CO2 27 07/12/2024    ANIONGAP 9 07/12/2024    GLC 97 07/12/2024    BUN 17.9 07/12/2024    CR 1.65 (H) 07/12/2024    GFRESTIMATED 45 (L) 07/12/2024    CAROLINE 9.6 07/12/2024         TSH   Date Value Ref Range Status   03/26/2024 1.58 0.30 - 4.20 uIU/mL Final            Assessment/Plan:    69 year old male with history of hyperlipidemia, DANETTE, and paroxysmal atrial fibrillation.  His EKG today shows sinus rhythm with normal intervals.  His EKG from the ED showed atrial fibrillation with rates in the 140s.  His event monitor did not record any atrial fibrillation.  Echocardiogram showed preserved LV function, no significant valvular disease, and normal atrial size.  His stress test was negative for ischemia but he did have a run of wide-complex tachycardia, possibly SVT with aberrancy.    We discussed the pathophysiology of atrial fibrillation and the long term implications.  I explained that atrial fibrillation has no cure, but is not dangerous and our main goal is to manage symptoms.  I discussed management strategies of rate versus rhythm control.  We discussed that patients who are completely asymptomatic and have no evidence of cardiomyopathy can remain in atrial fibrillation long term.  He is tolerating metoprolol reasonably well but does have some sinus bradycardia at baseline and he is still having occasional dizziness due to either recurrent A-fib or bradycardia.  I think we may be limited with rate control options and given his relatively young age I do think rhythm control would be preferred.  We discussed the options for rhythm  control, including anti-arrhythmic drug therapy and ablation, and the risks and benefits of these therapies.  We discussed the various anti-arrhythmic medications that may be used and why we may choose one over the other.  I am concerned that he may have issues with bradycardia on antiarrhythmic drugs as well.  We discussed the ablation procedure in detail and I explained the risks and benefits.  The patient was interested in proceeding with ablation.  We will plan on starting an antiarrhythmic drug in the blanking period following ablation.    We also discussed the other major risk of Afib is that of stroke.  The patient's CHADS VASc is 1 (female, age), which may not warrant long-term anticoagulation.  We discussed the risks and benefits of anticoagulation and the options for different medications.  He is currently doing well on Eliquis.  I explained it is especially important to stay on anticoagulation prior to and for at least 2 months following ablation.  We could consider stopping anticoagulation in the long-term.      Schedule Afib ablation  CT prior  Consider amiodarone or flecainide after  Continue metoprolol for now  Continue apixaban 5 mg BID  Routine follow-up postop      Rancho Obrien MD        Orders this Visit:  Orders Placed This Encounter   Procedures     CT Angiogram heart     EKG 12-lead complete w/read - Clinics (performed today)     Case Request EP: Ablation Atrial Fibrilation     Orders Placed This Encounter   Medications     rosuvastatin (CRESTOR) 40 MG tablet     Sig: Take 40 mg by mouth daily     Medications Discontinued During This Encounter   Medication Reason     atorvastatin (LIPITOR) 80 MG tablet        Encounter Diagnoses   Name Primary?     Paroxysmal atrial fibrillation (H) Yes     Syncope, unspecified syncope type      Sinus bradycardia      Today's clinic visit entailed:  Review of the result(s) of each unique test - echo  Assessment requiring an independent historian(s) - family -  "wife  The following tests were independently interpreted by me as noted in my documentation: EKG, event monitor, stress test  55 minutes spent by me on the date of the encounter doing chart review, history and exam, documentation and further activities per the note  The longitudinal plan of care for the diagnosis(es)/condition(s) as documented were addressed during this visit. Due to the added complexity in care, I will continue to support Eleno in the subsequent management and with ongoing continuity of care.    CURRENT MEDICATIONS:  Current Outpatient Medications   Medication Sig Dispense Refill     apixaban ANTICOAGULANT (ELIQUIS) 5 MG tablet Take 1 tablet (5 mg) by mouth 2 times daily 180 tablet 4     desvenlafaxine (PRISTIQ) 50 MG 24 hr tablet Take 50 mg by mouth daily       ezetimibe (ZETIA) 10 MG tablet Take 10 mg by mouth daily       metoprolol tartrate (LOPRESSOR) 25 MG tablet Take 0.5 tablets (12.5 mg) by mouth 2 times daily 90 tablet 4     rosuvastatin (CRESTOR) 40 MG tablet Take 40 mg by mouth daily       tamsulosin (FLOMAX) 0.4 MG capsule Take 0.4 mg by mouth daily       atorvastatin (LIPITOR) 80 MG tablet Take 40 mg by mouth daily         Review of Systems:  Pertinent review of system as stated above in HPI    Skin:        Eyes:       ENT:       Respiratory:       Cardiovascular:       Gastroenterology:      Genitourinary:       Musculoskeletal:       Neurologic:       Psychiatric:       Heme/Lymph/Imm:       Endocrine:         Physical Exam:  Vitals: /78   Pulse 65   Resp 18   Ht 1.727 m (5' 8\")   Wt 88 kg (194 lb)   SpO2 100%   BMI 29.50 kg/m      Constitutional: Pleasant, no apparent distress.  Respiratory: Breathing non-labored.   Cardiovascular:  Regular rate and rhythm  Neurologic: No gross motor deficits.   Psychiatric: Affect appropriate.        ALLERGIES  No Known Allergies    PAST MEDICAL HISTORY:  No past medical history on file.    PAST SURGICAL HISTORY:  No past surgical " history on file.    FAMILY HISTORY:  No family history on file.    SOCIAL HISTORY:  Social History     Socioeconomic History     Marital status:      Social Determinants of Health     Financial Resource Strain: Low Risk  (8/29/2023)    Received from [facility], [facility]    Financial Resource Strain      Difficulty of Paying Living Expenses: 3   Food Insecurity: No Food Insecurity (8/29/2023)    Received from [facility], [facility]    Food Insecurity      Worried About Running Out of Food in the Last Year: 1   Transportation Needs: No Transportation Needs (8/29/2023)    Received from [facility], [facility]    Transportation Needs      Lack of Transportation (Medical): 1   Social Connections: Socially Integrated (8/29/2023)    Received from [facility], [facility]    Social Connections      Frequency of Communication with Friends and Family: 0   Housing Stability: Low Risk  (8/29/2023)    Received from [facility], [facility]    Housing Stability      Unable to Pay for Housing in the Last Year: 1             CC  Farrukh Bates MD  [address]  [place] [address]      Thank you for allowing me to participate in the care of your patient.      Sincerely,     Rancho Obrien MD     [facility]  cc:   Farrukh Bates MD  [address]  [place] [address]

## 2024-08-01 NOTE — PROGRESS NOTES
[facility] HEART CLINIC  Cardiac Electrophysiology Clinic    Eleno Grant MRN# [id number]   YOB: 1955 Age: 69 year old       I had the pleasure of seeing Eleno Grant  who is a 69 year old male with history of hyperlipidemia, DANETTE, and paroxysmal atrial fibrillation.  He was evaluated by Dr. Bates in 4/2024 for syncope and atrial fibrillation.  The patient had an episode of syncope in 3/2024 while he was in [place].  He reported having dizziness/lightheadedness prior to passing out and did hit his head.  He was evaluated in the ED, and workup was unremarkable.  After returning to [place] he had another episode of severe lightheadedness and was evaluated in the ED again.  He was found to be in rapid atrial fibrillation and converted spontaneously.  He was started on low-dose metoprolol.  He did have a 30-day event monitor afterwards which did not show any recurrent atrial fibrillation.  His baseline EKG showed sinus bradycardia with rate of 51 bpm.      Today's Visit:  He is accompanied by his wife Olga.  He states he is still having occasional dizziness, but this has improved significantly since starting the new medication.  He did fully pass out in 3/2024 while he was in [place] and was evaluated at a trauma hospital.  His cardiac workup was negative at the time.    Prior to his ED visit in [place], he had another episode of severe dizziness and had to lay down, but did not pass out.  He does feel fluttering in his chest which he had not paid very much attention to.  He is still having occasional episodes of lightheadedness and when he checks his smart watch it will frequently say possible atrial fibrillation.  This was happening every day until the past month.  He is now having episodes every 2 to 3 days and only lasting a few minutes.  He is also occasionally noticing slow heart rates at rest.  He recalls having a prior episode of syncope about 2 years ago  after he started venlafaxine.  He attributed it to starting a new medication and having a couple of drinks with friends.  He has not had any significant bleeding issues on Eliquis.  He recently had surgery for a kidney stone and stent placement.  He tolerated the procedure well.  He has no personal history of cardiovascular disease.  He was recently evaluated in sleep medicine and diagnosed with sleep apnea.  He has not gotten his CPAP yet.  He does have significant family history of cardiovascular disease.  His dad has an ICD.  His sister has atrial fibrillation and had an ablation, and his brother also had atrial fibrillation and has a pacemaker.        Diagnostic Testing:  EKG today, which I overread, showed Sinus rhythm rate 64 bpm.  , QRS 96, QTc 415 ms.  EKG 4/24/2024-sinus bradycardia rate 51 bpm.  , QRS 96, QTc 409 ms.  EKG 3/26/2024-atrial fibrillation rate 141 bpm    Event Monitor 3/2024 - personally reviewed  Sinus rhythm with occasional PVCs  No A-fib recorded    Echocardiogram 3/27/2024    Technically difficult study limited views obtained due to body habitus  The visual ejection fraction is estimated at 55%.  Left ventricular diastolic function is normal.  Lateral wall motion lower limits of normal, remainder of LV wall motion is  normal  Sinus rhythm was noted.      Stress Testing 5/23/2024  Normal resting heart rate and blood pressure with normal response to exercise.  No chest pain with exercise  High normal exercise capacity for age  Normal resting ECG, no ischemic changes seen with exercise.  At 1:26 recovery the patient developed asymptomatic, regular, wide complex  tachycardia, rate 184 to 193 bpm. This is most likely SVT with aberrancy,  although VT cannot completely excluded. The arrhythmia lasted about 50  seconds, resolving spontaneously  Normal resting LV function with good augmentation of all wall segments post  exercise.  This study showed no evidence of inducible myocardial  ischemia.        Last Comprehensive Metabolic Panel:  Lab Results   Component Value Date     07/12/2024    POTASSIUM 4.4 07/12/2024    CHLORIDE 104 07/12/2024    CO2 27 07/12/2024    ANIONGAP 9 07/12/2024    GLC 97 07/12/2024    BUN 17.9 07/12/2024    CR 1.65 (H) 07/12/2024    GFRESTIMATED 45 (L) 07/12/2024    CAROLINE 9.6 07/12/2024         TSH   Date Value Ref Range Status   03/26/2024 1.58 0.30 - 4.20 uIU/mL Final            Assessment/Plan:    69 year old male with history of hyperlipidemia, DANETTE, and paroxysmal atrial fibrillation.  His EKG today shows sinus rhythm with normal intervals.  His EKG from the ED showed atrial fibrillation with rates in the 140s.  His event monitor did not record any atrial fibrillation.  Echocardiogram showed preserved LV function, no significant valvular disease, and normal atrial size.  His stress test was negative for ischemia but he did have a run of wide-complex tachycardia, possibly SVT with aberrancy.    We discussed the pathophysiology of atrial fibrillation and the long term implications.  I explained that atrial fibrillation has no cure, but is not dangerous and our main goal is to manage symptoms.  I discussed management strategies of rate versus rhythm control.  We discussed that patients who are completely asymptomatic and have no evidence of cardiomyopathy can remain in atrial fibrillation long term.  He is tolerating metoprolol reasonably well but does have some sinus bradycardia at baseline and he is still having occasional dizziness due to either recurrent A-fib or bradycardia.  I think we may be limited with rate control options and given his relatively young age I do think rhythm control would be preferred.  We discussed the options for rhythm control, including anti-arrhythmic drug therapy and ablation, and the risks and benefits of these therapies.  We discussed the various anti-arrhythmic medications that may be used and why we may choose one over the other.   I am concerned that he may have issues with bradycardia on antiarrhythmic drugs as well.  We discussed the ablation procedure in detail and I explained the risks and benefits.  The patient was interested in proceeding with ablation.  We will plan on starting an antiarrhythmic drug in the blanking period following ablation.    We also discussed the other major risk of Afib is that of stroke.  The patient's CHADS VASc is 1 (age), which may not warrant long-term anticoagulation.  We discussed the risks and benefits of anticoagulation and the options for different medications.  He is currently doing well on Eliquis.  I explained it is especially important to stay on anticoagulation prior to and for at least 2 months following ablation.  We could consider stopping anticoagulation in the long-term.      Schedule Afib ablation  CT prior  Consider amiodarone or flecainide after  Continue metoprolol for now  Continue apixaban 5 mg BID  Routine follow-up postop      Rancho Obrien MD        Orders this Visit:  Orders Placed This Encounter   Procedures    CT Angiogram heart    EKG 12-lead complete w/read - Clinics (performed today)    Case Request EP: Ablation Atrial Fibrilation     Orders Placed This Encounter   Medications    rosuvastatin (CRESTOR) 40 MG tablet     Sig: Take 40 mg by mouth daily     Medications Discontinued During This Encounter   Medication Reason    atorvastatin (LIPITOR) 80 MG tablet        Encounter Diagnoses   Name Primary?    Paroxysmal atrial fibrillation (H) Yes    Syncope, unspecified syncope type     Sinus bradycardia      Today's clinic visit entailed:  Review of the result(s) of each unique test - echo  Assessment requiring an independent historian(s) - family - wife  The following tests were independently interpreted by me as noted in my documentation: EKG, event monitor, stress test  55 minutes spent by me on the date of the encounter doing chart review, history and exam, documentation and further  "activities per the note  The longitudinal plan of care for the diagnosis(es)/condition(s) as documented were addressed during this visit. Due to the added complexity in care, I will continue to support Eleno in the subsequent management and with ongoing continuity of care.    CURRENT MEDICATIONS:  Current Outpatient Medications   Medication Sig Dispense Refill    apixaban ANTICOAGULANT (ELIQUIS) 5 MG tablet Take 1 tablet (5 mg) by mouth 2 times daily 180 tablet 4    desvenlafaxine (PRISTIQ) 50 MG 24 hr tablet Take 50 mg by mouth daily      ezetimibe (ZETIA) 10 MG tablet Take 10 mg by mouth daily      metoprolol tartrate (LOPRESSOR) 25 MG tablet Take 0.5 tablets (12.5 mg) by mouth 2 times daily 90 tablet 4    rosuvastatin (CRESTOR) 40 MG tablet Take 40 mg by mouth daily      tamsulosin (FLOMAX) 0.4 MG capsule Take 0.4 mg by mouth daily      atorvastatin (LIPITOR) 80 MG tablet Take 40 mg by mouth daily         Review of Systems:  Pertinent review of system as stated above in HPI    Skin:        Eyes:       ENT:       Respiratory:       Cardiovascular:       Gastroenterology:      Genitourinary:       Musculoskeletal:       Neurologic:       Psychiatric:       Heme/Lymph/Imm:       Endocrine:         Physical Exam:  Vitals: /78   Pulse 65   Resp 18   Ht 1.727 m (5' 8\")   Wt 88 kg (194 lb)   SpO2 100%   BMI 29.50 kg/m      Constitutional: Pleasant, no apparent distress.  Respiratory: Breathing non-labored.   Cardiovascular:  Regular rate and rhythm  Neurologic: No gross motor deficits.   Psychiatric: Affect appropriate.        ALLERGIES  No Known Allergies    PAST MEDICAL HISTORY:  No past medical history on file.    PAST SURGICAL HISTORY:  No past surgical history on file.    FAMILY HISTORY:  No family history on file.    SOCIAL HISTORY:  Social History     Socioeconomic History    Marital status:      Social Determinants of Health     Financial Resource Strain: Low Risk  (8/29/2023)    Received from " [facility], [facility]    Financial Resource Strain     Difficulty of Paying Living Expenses: 3   Food Insecurity: No Food Insecurity (8/29/2023)    Received from [facility], [facility]    Food Insecurity     Worried About Running Out of Food in the Last Year: 1   Transportation Needs: No Transportation Needs (8/29/2023)    Received from [facility], [facility]    Transportation Needs     Lack of Transportation (Medical): 1   Social Connections: Socially Integrated (8/29/2023)    Received from [facility], [facility]    Social Connections     Frequency of Communication with Friends and Family: 0   Housing Stability: Low Risk  (8/29/2023)    Received from [facility], [facility]    Housing Stability     Unable to Pay for Housing in the Last Year: 1             CC  Farrukh Bates MD  [address]  [place] [address]

## 2024-08-08 ENCOUNTER — TELEPHONE (OUTPATIENT)
Dept: CARDIOLOGY | Facility: CLINIC | Age: 69
End: 2024-08-08
Payer: COMMERCIAL

## 2024-08-08 NOTE — TELEPHONE ENCOUNTER
Health Call Center    Phone Message    May a detailed message be left on voicemail: yes     Reason for Call: Other: Patient called to see if the appointment on 8/13 with Darcy is necessary since he has seen a cardiologist, has an ablation set up, and the follow up for the ablation set up. Please call back to patient and let him know whether he should keep that appointment or not.      Action Taken: Other: Cardiology    Travel Screening: Not Applicable     Thank you!  [facility]

## 2024-08-08 NOTE — TELEPHONE ENCOUNTER
Spoke to patient who is now established with EP and is scheduled for afib ablation in Sept.  Appointment on 8/13 no necessary.  Will cancel.  Patient provided verbal understanding.  JERALD Martins

## 2024-08-23 ENCOUNTER — ANCILLARY PROCEDURE (OUTPATIENT)
Dept: ULTRASOUND IMAGING | Facility: CLINIC | Age: 69
End: 2024-08-23
Attending: UROLOGY
Payer: COMMERCIAL

## 2024-08-23 DIAGNOSIS — N20.0 CALCULUS OF KIDNEY: ICD-10-CM

## 2024-08-23 PROCEDURE — 76770 US EXAM ABDO BACK WALL COMP: CPT

## 2024-09-06 ENCOUNTER — TELEPHONE (OUTPATIENT)
Dept: CARDIOLOGY | Facility: CLINIC | Age: 69
End: 2024-09-06
Payer: COMMERCIAL

## 2024-09-06 NOTE — TELEPHONE ENCOUNTER
9/6/24 Pt called and LVM on Afib RN line asking whether he needed a pre-op physical with his PCP before Ablation set for 9/23. Attempted to call pt back, call went straight to , left detailed msg and also sent [employer] message to pt informing him he does not need to see PCP before Ablation.  Left Afib RN callback # for addl questions   Delia 1040 am

## 2024-09-18 ENCOUNTER — TELEPHONE (OUTPATIENT)
Dept: CARDIOLOGY | Facility: CLINIC | Age: 69
End: 2024-09-18
Payer: COMMERCIAL

## 2024-09-18 NOTE — TELEPHONE ENCOUNTER
[facility] Call Center    Phone Message    May a detailed message be left on voicemail: yes     Reason for Call: Other: Eleno called requesting to speak with his care team about his prep that needs to be done prior to his ablation on Monday. Please reach out to Eleno to discuss. Thank you!     Action Taken: Other: Cardiology    Travel Screening: Not Applicable    Thank you!  [facility]       Date of Service:

## 2024-09-18 NOTE — TELEPHONE ENCOUNTER
9/18/24 Attempted to call pt , reached VM, LM and infromed him he will receive a call on Friday 9/20 to go over details of procedure. Sent [facility] message with this info as well  Delia 1152 am

## 2024-09-20 ENCOUNTER — TELEPHONE (OUTPATIENT)
Dept: CARDIOLOGY | Facility: CLINIC | Age: 69
End: 2024-09-20

## 2024-09-20 ENCOUNTER — HOSPITAL ENCOUNTER (OUTPATIENT)
Dept: CARDIOLOGY | Facility: CLINIC | Age: 69
Discharge: HOME | End: 2024-09-20
Attending: INTERNAL MEDICINE | Admitting: INTERNAL MEDICINE
Payer: COMMERCIAL

## 2024-09-20 VITALS — DIASTOLIC BLOOD PRESSURE: 99 MMHG | HEART RATE: 80 BPM | SYSTOLIC BLOOD PRESSURE: 145 MMHG

## 2024-09-20 DIAGNOSIS — I48.0 PAROXYSMAL ATRIAL FIBRILLATION (H): Primary | ICD-10-CM

## 2024-09-20 DIAGNOSIS — I48.0 PAROXYSMAL ATRIAL FIBRILLATION (H): ICD-10-CM

## 2024-09-20 LAB
CREAT BLD-MCNC: 1.7 MG/DL (ref 0.7–1.3)
EGFRCR SERPLBLD CKD-EPI 2021: 43 ML/MIN/1.73M2

## 2024-09-20 PROCEDURE — 250N000011 HC RX IP 250 OP 636: Performed by: INTERNAL MEDICINE

## 2024-09-20 PROCEDURE — 82565 ASSAY OF CREATININE: CPT

## 2024-09-20 PROCEDURE — 75572 CT HRT W/3D IMAGE: CPT | Mod: 26 | Performed by: INTERNAL MEDICINE

## 2024-09-20 PROCEDURE — 75572 CT HRT W/3D IMAGE: CPT

## 2024-09-20 RX ORDER — ONDANSETRON 2 MG/ML
4 INJECTION INTRAMUSCULAR; INTRAVENOUS
Status: DISCONTINUED | OUTPATIENT
Start: 2024-09-20 | End: 2024-09-21 | Stop reason: HOSPADM

## 2024-09-20 RX ORDER — IOPAMIDOL 755 MG/ML
500 INJECTION, SOLUTION INTRAVASCULAR ONCE
Status: COMPLETED | OUTPATIENT
Start: 2024-09-20 | End: 2024-09-20

## 2024-09-20 RX ORDER — SODIUM CHLORIDE, SODIUM LACTATE, POTASSIUM CHLORIDE, CALCIUM CHLORIDE 600; 310; 30; 20 MG/100ML; MG/100ML; MG/100ML; MG/100ML
INJECTION, SOLUTION INTRAVENOUS CONTINUOUS
Status: CANCELLED | OUTPATIENT
Start: 2024-09-20

## 2024-09-20 RX ORDER — LIDOCAINE 40 MG/G
CREAM TOPICAL
Status: DISCONTINUED | OUTPATIENT
Start: 2024-09-20 | End: 2024-09-21 | Stop reason: HOSPADM

## 2024-09-20 RX ORDER — DIPHENHYDRAMINE HYDROCHLORIDE 50 MG/ML
25-50 INJECTION INTRAMUSCULAR; INTRAVENOUS
Status: DISCONTINUED | OUTPATIENT
Start: 2024-09-20 | End: 2024-09-21 | Stop reason: HOSPADM

## 2024-09-20 RX ORDER — LIDOCAINE 40 MG/G
CREAM TOPICAL
Status: CANCELLED | OUTPATIENT
Start: 2024-09-20

## 2024-09-20 RX ORDER — METHYLPREDNISOLONE SODIUM SUCCINATE 125 MG/2ML
125 INJECTION, POWDER, LYOPHILIZED, FOR SOLUTION INTRAMUSCULAR; INTRAVENOUS
Status: DISCONTINUED | OUTPATIENT
Start: 2024-09-20 | End: 2024-09-21 | Stop reason: HOSPADM

## 2024-09-20 RX ORDER — DIPHENHYDRAMINE HCL 25 MG
25 CAPSULE ORAL
Status: DISCONTINUED | OUTPATIENT
Start: 2024-09-20 | End: 2024-09-21 | Stop reason: HOSPADM

## 2024-09-20 RX ADMIN — IOPAMIDOL 100 ML: 755 INJECTION, SOLUTION INTRAVENOUS at 10:18

## 2024-09-20 NOTE — TELEPHONE ENCOUNTER
9/20/24 Called pt regarding Afib Ablation scheduled for 9/23/24  Notified of arrival time and location - 630 am [facility] Welcome desk   No solids 8 hours prior to arrival time  Clear liquids up until 2 hours prior to arrival  Discussed clear liquids allowed ( 7 up, ginger ale, chicken broth, apple juice, water, coffee with no cream or sugar)     Pt may have sips of water for am meds  Discussed meds to be held - NONE  Oral diabetes meds: NONE  Insulin: NONE  SGLT2 Inhibitors: NONE   GLP-1 Agonists: NONE  Diuretic: NONE    Discussed that patient will need a  for ride home and someone to stay with pt x 24 hours once pt arrives home   Pt will check temperature am of procedure and call [facility] at [phone] in the am in temp > 100.0  Pt voiced understanding and stated they have no further questions at this time.  Delia  1220 pm

## 2024-09-22 ENCOUNTER — ANESTHESIA EVENT (OUTPATIENT)
Dept: CARDIOLOGY | Facility: CLINIC | Age: 69
End: 2024-09-22
Payer: COMMERCIAL

## 2024-09-23 ENCOUNTER — ANESTHESIA (OUTPATIENT)
Dept: CARDIOLOGY | Facility: CLINIC | Age: 69
End: 2024-09-23
Payer: COMMERCIAL

## 2024-09-23 ENCOUNTER — HOSPITAL ENCOUNTER (OUTPATIENT)
Facility: CLINIC | Age: 69
Discharge: HOME | End: 2024-09-23
Attending: INTERNAL MEDICINE | Admitting: INTERNAL MEDICINE
Payer: COMMERCIAL

## 2024-09-23 VITALS
DIASTOLIC BLOOD PRESSURE: 68 MMHG | OXYGEN SATURATION: 95 % | RESPIRATION RATE: 16 BRPM | BODY MASS INDEX: 28.56 KG/M2 | WEIGHT: 182 LBS | HEIGHT: 67 IN | SYSTOLIC BLOOD PRESSURE: 118 MMHG | HEART RATE: 67 BPM | TEMPERATURE: 97 F

## 2024-09-23 DIAGNOSIS — I48.0 PAROXYSMAL ATRIAL FIBRILLATION (H): Primary | ICD-10-CM

## 2024-09-23 DIAGNOSIS — I48.91 ATRIAL FIBRILLATION WITH RVR (H): ICD-10-CM

## 2024-09-23 LAB
ACT BLD: 274 SECONDS (ref 74–150)
ACT BLD: 295 SECONDS (ref 74–150)
ACT BLD: 299 SECONDS (ref 74–150)
ACT BLD: 396 SECONDS (ref 74–150)
ACT BLD: 413 SECONDS (ref 74–150)
ANION GAP SERPL CALCULATED.3IONS-SCNC: 6 MMOL/L (ref 7–15)
ATRIAL RATE - MUSE: 51 BPM
ATRIAL RATE - MUSE: 75 BPM
BUN SERPL-MCNC: 16.1 MG/DL (ref 8–23)
CALCIUM SERPL-MCNC: 9.1 MG/DL (ref 8.8–10.4)
CHLORIDE SERPL-SCNC: 104 MMOL/L (ref 98–107)
CREAT SERPL-MCNC: 1.66 MG/DL (ref 0.67–1.17)
DIASTOLIC BLOOD PRESSURE - MUSE: NORMAL MMHG
DIASTOLIC BLOOD PRESSURE - MUSE: NORMAL MMHG
EGFRCR SERPLBLD CKD-EPI 2021: 45 ML/MIN/1.73M2
ERYTHROCYTE [DISTWIDTH] IN BLOOD BY AUTOMATED COUNT: 13.3 % (ref 10–15)
GLUCOSE SERPL-MCNC: 96 MG/DL (ref 70–99)
HCO3 SERPL-SCNC: 30 MMOL/L (ref 22–29)
HCT VFR BLD AUTO: 41.4 % (ref 40–53)
HGB BLD-MCNC: 13.3 G/DL (ref 13.3–17.7)
INTERPRETATION ECG - MUSE: NORMAL
INTERPRETATION ECG - MUSE: NORMAL
MAGNESIUM SERPL-MCNC: 2.2 MG/DL (ref 1.7–2.3)
MCH RBC QN AUTO: 29.3 PG (ref 26.5–33)
MCHC RBC AUTO-ENTMCNC: 32.1 G/DL (ref 31.5–36.5)
MCV RBC AUTO: 91 FL (ref 78–100)
P AXIS - MUSE: 42 DEGREES
P AXIS - MUSE: 70 DEGREES
PLATELET # BLD AUTO: 169 10E3/UL (ref 150–450)
POTASSIUM SERPL-SCNC: 4.4 MMOL/L (ref 3.4–5.3)
PR INTERVAL - MUSE: 134 MS
PR INTERVAL - MUSE: 150 MS
QRS DURATION - MUSE: 84 MS
QRS DURATION - MUSE: 84 MS
QT - MUSE: 428 MS
QT - MUSE: 472 MS
QTC - MUSE: 435 MS
QTC - MUSE: 477 MS
R AXIS - MUSE: 52 DEGREES
R AXIS - MUSE: 67 DEGREES
RBC # BLD AUTO: 4.54 10E6/UL (ref 4.4–5.9)
SODIUM SERPL-SCNC: 140 MMOL/L (ref 135–145)
SYSTOLIC BLOOD PRESSURE - MUSE: NORMAL MMHG
SYSTOLIC BLOOD PRESSURE - MUSE: NORMAL MMHG
T AXIS - MUSE: 48 DEGREES
T AXIS - MUSE: 53 DEGREES
VENTRICULAR RATE- MUSE: 51 BPM
VENTRICULAR RATE- MUSE: 75 BPM
WBC # BLD AUTO: 5 10E3/UL (ref 4–11)

## 2024-09-23 PROCEDURE — C1760 CLOSURE DEV, VASC: HCPCS | Performed by: INTERNAL MEDICINE

## 2024-09-23 PROCEDURE — 250N000011 HC RX IP 250 OP 636: Performed by: INTERNAL MEDICINE

## 2024-09-23 PROCEDURE — 258N000003 HC RX IP 258 OP 636: Performed by: INTERNAL MEDICINE

## 2024-09-23 PROCEDURE — 80048 BASIC METABOLIC PNL TOTAL CA: CPT | Performed by: INTERNAL MEDICINE

## 2024-09-23 PROCEDURE — 93656 COMPRE EP EVAL ABLTJ ATR FIB: CPT | Performed by: INTERNAL MEDICINE

## 2024-09-23 PROCEDURE — 83735 ASSAY OF MAGNESIUM: CPT | Performed by: INTERNAL MEDICINE

## 2024-09-23 PROCEDURE — 93615 ESOPHAGEAL RECORDING: CPT | Performed by: INTERNAL MEDICINE

## 2024-09-23 PROCEDURE — 250N000025 HC SEVOFLURANE, PER MIN: Performed by: INTERNAL MEDICINE

## 2024-09-23 PROCEDURE — C1733 CATH, EP, OTHR THAN COOL-TIP: HCPCS | Performed by: INTERNAL MEDICINE

## 2024-09-23 PROCEDURE — C1759 CATH, INTRA ECHOCARDIOGRAPHY: HCPCS | Performed by: INTERNAL MEDICINE

## 2024-09-23 PROCEDURE — 999N000054 HC STATISTIC EKG NON-CHARGEABLE

## 2024-09-23 PROCEDURE — 272N000001 HC OR GENERAL SUPPLY STERILE: Performed by: INTERNAL MEDICINE

## 2024-09-23 PROCEDURE — 370N000017 HC ANESTHESIA TECHNICAL FEE, PER MIN: Performed by: INTERNAL MEDICINE

## 2024-09-23 PROCEDURE — 258N000003 HC RX IP 258 OP 636: Performed by: ANESTHESIOLOGY

## 2024-09-23 PROCEDURE — 250N000011 HC RX IP 250 OP 636: Performed by: ANESTHESIOLOGY

## 2024-09-23 PROCEDURE — 93615 ESOPHAGEAL RECORDING: CPT | Mod: 26 | Performed by: INTERNAL MEDICINE

## 2024-09-23 PROCEDURE — 93005 ELECTROCARDIOGRAM TRACING: CPT

## 2024-09-23 PROCEDURE — 85027 COMPLETE CBC AUTOMATED: CPT | Performed by: INTERNAL MEDICINE

## 2024-09-23 PROCEDURE — 250N000009 HC RX 250: Performed by: ANESTHESIOLOGY

## 2024-09-23 PROCEDURE — 93656 COMPRE EP EVAL ABLTJ ATR FIB: CPT | Performed by: ANESTHESIOLOGY

## 2024-09-23 PROCEDURE — 85347 COAGULATION TIME ACTIVATED: CPT

## 2024-09-23 PROCEDURE — C1769 GUIDE WIRE: HCPCS | Performed by: INTERNAL MEDICINE

## 2024-09-23 PROCEDURE — 999N000184 HC STATISTIC TELEMETRY

## 2024-09-23 PROCEDURE — C1732 CATH, EP, DIAG/ABL, 3D/VECT: HCPCS | Performed by: INTERNAL MEDICINE

## 2024-09-23 PROCEDURE — 999N000071 HC STATISTIC HEART CATH LAB OR EP LAB

## 2024-09-23 PROCEDURE — 36415 COLL VENOUS BLD VENIPUNCTURE: CPT | Performed by: INTERNAL MEDICINE

## 2024-09-23 PROCEDURE — 93656 COMPRE EP EVAL ABLTJ ATR FIB: CPT | Performed by: NURSE ANESTHETIST, CERTIFIED REGISTERED

## 2024-09-23 PROCEDURE — C1730 CATH, EP, 19 OR FEW ELECT: HCPCS | Performed by: INTERNAL MEDICINE

## 2024-09-23 PROCEDURE — 93010 ELECTROCARDIOGRAM REPORT: CPT | Mod: XU | Performed by: INTERNAL MEDICINE

## 2024-09-23 RX ORDER — NALOXONE HYDROCHLORIDE 0.4 MG/ML
0.2 INJECTION, SOLUTION INTRAMUSCULAR; INTRAVENOUS; SUBCUTANEOUS
Status: DISCONTINUED | OUTPATIENT
Start: 2024-09-23 | End: 2024-09-23 | Stop reason: HOSPADM

## 2024-09-23 RX ORDER — HEPARIN SODIUM 1000 [USP'U]/ML
INJECTION, SOLUTION INTRAVENOUS; SUBCUTANEOUS
Status: DISCONTINUED | OUTPATIENT
Start: 2024-09-23 | End: 2024-09-23 | Stop reason: HOSPADM

## 2024-09-23 RX ORDER — LIDOCAINE HYDROCHLORIDE 20 MG/ML
INJECTION, SOLUTION INFILTRATION; PERINEURAL PRN
Status: DISCONTINUED | OUTPATIENT
Start: 2024-09-23 | End: 2024-09-23

## 2024-09-23 RX ORDER — GLYCOPYRROLATE 0.2 MG/ML
INJECTION, SOLUTION INTRAMUSCULAR; INTRAVENOUS PRN
Status: DISCONTINUED | OUTPATIENT
Start: 2024-09-23 | End: 2024-09-23

## 2024-09-23 RX ORDER — AMIODARONE HYDROCHLORIDE 200 MG/1
200 TABLET ORAL DAILY
Qty: 90 TABLET | Refills: 0 | Status: SHIPPED | OUTPATIENT
Start: 2024-09-23 | End: 2024-09-23

## 2024-09-23 RX ORDER — PROTAMINE SULFATE 10 MG/ML
INJECTION, SOLUTION INTRAVENOUS
Status: DISCONTINUED | OUTPATIENT
Start: 2024-09-23 | End: 2024-09-23 | Stop reason: HOSPADM

## 2024-09-23 RX ORDER — ACETAMINOPHEN 325 MG/1
650 TABLET ORAL EVERY 4 HOURS PRN
Status: DISCONTINUED | OUTPATIENT
Start: 2024-09-23 | End: 2024-09-23 | Stop reason: HOSPADM

## 2024-09-23 RX ORDER — NALOXONE HYDROCHLORIDE 0.4 MG/ML
0.4 INJECTION, SOLUTION INTRAMUSCULAR; INTRAVENOUS; SUBCUTANEOUS
Status: DISCONTINUED | OUTPATIENT
Start: 2024-09-23 | End: 2024-09-23 | Stop reason: HOSPADM

## 2024-09-23 RX ORDER — LIDOCAINE 40 MG/G
CREAM TOPICAL
Status: DISCONTINUED | OUTPATIENT
Start: 2024-09-23 | End: 2024-09-23 | Stop reason: HOSPADM

## 2024-09-23 RX ORDER — AMIODARONE HYDROCHLORIDE 200 MG/1
200 TABLET ORAL DAILY
Qty: 90 TABLET | Refills: 0 | Status: SHIPPED | OUTPATIENT
Start: 2024-09-23

## 2024-09-23 RX ORDER — ONDANSETRON 2 MG/ML
INJECTION INTRAMUSCULAR; INTRAVENOUS PRN
Status: DISCONTINUED | OUTPATIENT
Start: 2024-09-23 | End: 2024-09-23

## 2024-09-23 RX ORDER — PROPOFOL 10 MG/ML
INJECTION, EMULSION INTRAVENOUS PRN
Status: DISCONTINUED | OUTPATIENT
Start: 2024-09-23 | End: 2024-09-23

## 2024-09-23 RX ORDER — SODIUM CHLORIDE, SODIUM LACTATE, POTASSIUM CHLORIDE, CALCIUM CHLORIDE 600; 310; 30; 20 MG/100ML; MG/100ML; MG/100ML; MG/100ML
INJECTION, SOLUTION INTRAVENOUS CONTINUOUS
Status: DISCONTINUED | OUTPATIENT
Start: 2024-09-23 | End: 2024-09-23 | Stop reason: HOSPADM

## 2024-09-23 RX ORDER — DEXAMETHASONE SODIUM PHOSPHATE 4 MG/ML
INJECTION, SOLUTION INTRA-ARTICULAR; INTRALESIONAL; INTRAMUSCULAR; INTRAVENOUS; SOFT TISSUE PRN
Status: DISCONTINUED | OUTPATIENT
Start: 2024-09-23 | End: 2024-09-23

## 2024-09-23 RX ORDER — FENTANYL CITRATE 50 UG/ML
INJECTION, SOLUTION INTRAMUSCULAR; INTRAVENOUS PRN
Status: DISCONTINUED | OUTPATIENT
Start: 2024-09-23 | End: 2024-09-23

## 2024-09-23 RX ADMIN — SODIUM CHLORIDE, POTASSIUM CHLORIDE, SODIUM LACTATE AND CALCIUM CHLORIDE: 600; 310; 30; 20 INJECTION, SOLUTION INTRAVENOUS at 07:15

## 2024-09-23 RX ADMIN — PHENYLEPHRINE HYDROCHLORIDE 200 MCG: 10 INJECTION INTRAVENOUS at 11:01

## 2024-09-23 RX ADMIN — SUGAMMADEX 200 MG: 100 INJECTION, SOLUTION INTRAVENOUS at 11:14

## 2024-09-23 RX ADMIN — SODIUM CHLORIDE, POTASSIUM CHLORIDE, SODIUM LACTATE AND CALCIUM CHLORIDE: 600; 310; 30; 20 INJECTION, SOLUTION INTRAVENOUS at 11:14

## 2024-09-23 RX ADMIN — PROPOFOL 200 MG: 10 INJECTION, EMULSION INTRAVENOUS at 08:28

## 2024-09-23 RX ADMIN — PHENYLEPHRINE HYDROCHLORIDE 100 MCG: 10 INJECTION INTRAVENOUS at 08:40

## 2024-09-23 RX ADMIN — GLYCOPYRROLATE 0.2 MG: 0.2 INJECTION, SOLUTION INTRAMUSCULAR; INTRAVENOUS at 08:46

## 2024-09-23 RX ADMIN — PHENYLEPHRINE HYDROCHLORIDE 100 MCG: 10 INJECTION INTRAVENOUS at 08:59

## 2024-09-23 RX ADMIN — PHENYLEPHRINE HYDROCHLORIDE 100 MCG: 10 INJECTION INTRAVENOUS at 09:00

## 2024-09-23 RX ADMIN — LIDOCAINE HYDROCHLORIDE 100 MG: 20 INJECTION, SOLUTION INFILTRATION; PERINEURAL at 08:28

## 2024-09-23 RX ADMIN — PHENYLEPHRINE HYDROCHLORIDE 150 MCG: 10 INJECTION INTRAVENOUS at 08:46

## 2024-09-23 RX ADMIN — GLYCOPYRROLATE 0.2 MG: 0.2 INJECTION, SOLUTION INTRAMUSCULAR; INTRAVENOUS at 08:43

## 2024-09-23 RX ADMIN — FENTANYL CITRATE 100 MCG: 50 INJECTION INTRAMUSCULAR; INTRAVENOUS at 08:28

## 2024-09-23 RX ADMIN — DEXAMETHASONE SODIUM PHOSPHATE 4 MG: 4 INJECTION, SOLUTION INTRA-ARTICULAR; INTRALESIONAL; INTRAMUSCULAR; INTRAVENOUS; SOFT TISSUE at 08:45

## 2024-09-23 RX ADMIN — PHENYLEPHRINE HYDROCHLORIDE 150 MCG: 10 INJECTION INTRAVENOUS at 11:03

## 2024-09-23 RX ADMIN — ONDANSETRON 4 MG: 2 INJECTION INTRAMUSCULAR; INTRAVENOUS at 11:09

## 2024-09-23 RX ADMIN — ROCURONIUM BROMIDE 50 MG: 50 INJECTION, SOLUTION INTRAVENOUS at 08:28

## 2024-09-23 RX ADMIN — MIDAZOLAM 2 MG: 1 INJECTION INTRAMUSCULAR; INTRAVENOUS at 08:23

## 2024-09-23 RX ADMIN — PHENYLEPHRINE HYDROCHLORIDE 0.4 MCG/KG/MIN: 10 INJECTION INTRAVENOUS at 08:40

## 2024-09-23 RX ADMIN — PHENYLEPHRINE HYDROCHLORIDE 100 MCG: 10 INJECTION INTRAVENOUS at 10:33

## 2024-09-23 RX ADMIN — PHENYLEPHRINE HYDROCHLORIDE 200 MCG: 10 INJECTION INTRAVENOUS at 08:50

## 2024-09-23 RX ADMIN — PHENYLEPHRINE HYDROCHLORIDE 100 MCG: 10 INJECTION INTRAVENOUS at 08:37

## 2024-09-23 ASSESSMENT — LIFESTYLE VARIABLES: TOBACCO_USE: 1

## 2024-09-23 ASSESSMENT — ACTIVITIES OF DAILY LIVING (ADL)
ADLS_ACUITY_SCORE: 35

## 2024-09-23 ASSESSMENT — ENCOUNTER SYMPTOMS: DYSRHYTHMIAS: 1

## 2024-09-23 ASSESSMENT — VISUAL ACUITY: OU: GLASSES

## 2024-09-23 NOTE — PROGRESS NOTES
[facility] Admission Nursing Note    Patient Information  Name: Eleno Grant  Age: 69 year old  Reason for admission: Atrial Fib ablation   [facility] arrival time: 0635    Visitor Information  Name: Olga      Patient Admission/Assessment   Pre-procedure assessment complete: Yes  If abnormal assessment/labs, provider notified: N/A  NPO: Yes  Medications held per instructions/orders: Yes  Consent: deferred  If applicable, pregnancy test status: deferred  Patient oriented to room: Yes  Education/questions answered: Yes  Plan/other: proceed with plan     Discharge Planning  Discharge name/phone number: Olga  [phone]  Overnight post sedation caregiver: Olga  Discharge location: home    Feliz Caballero RN

## 2024-09-23 NOTE — DISCHARGE INSTRUCTIONS
A Fib Ablation Discharge Instructions    After you go home:  Have an adult stay with you until tomorrow.  You may eat your normal diet, unless your doctor tells you otherwise.  RELAX and take it easy for 3 days.        For 24-48 hours (due to the sedation you received):  DO NOT DRIVE FOR 2 DAYS!   Do NOT make any important or legal decisions.  Do NOT drive or operate machines at home or at work.  Do NOT drink alcohol.    Care of Puncture Site:  Check the puncture site severy 1-2 hours while awake.  For 2-3 days, when you cough, sneeze, laugh or move your bowels, hold your hand over the puncture sites and press firmly.  Please remove Dressing after 24 hours, works best to take off in shower.  Then apply a band aid daily for at least 3 days (if needed). If there is minor oozing, apply another band aid and remove it after 12 hours.   It is normal to have a bruising or a small lump that is present under the skin . This will go away on its own after 3-4 weeks.   You may shower. Do NOT take a bath, or use a hot tub or pool until groin site heals, which may take up to a week.  Do NOT scrub the site. Do NOT use lotion or powder near the puncture site.    Activity:  NO bending, stooping over or squatting  for 3 days  Do NOT lift, push or pull more than 10 pounds (equal to a gallon of milk) for 3 days.  NO repetitive motions such as loading , vacuuming, raking, shoveling,   Limit going up and down the stairs repetitively, for the first 24 hours after procedure.     Bleeding:  If you start bleeding from the groin site, lie down flat and press firmly on the site for 10 minutes or until bleeding stops.   Once bleeding stops, lay flat for 1-2 hours.  Call your A Fib nurse if bleeding does not stop or after hours will need to go to ER.       Go to ER or Call 911 right away if you have heavy bleeding or bleeding that does not stop.    Medications:  Take your medications, including blood thinners, unless your doctor tells  you not to.  If you have stopped any other medicines, check with your nurse or provider about when to restart them.  If you have PAIN or a TIGHTNESS in your chest, you MAY take Tylenol (acetaminophen) and if this does not help, you MAY take Advil (ibuprofen-400 mg with food).  If you have constipation or prone to constipation,  take a fiber supplement, ie metamucil or stool softners.    Call the A Fib RN if:  Chest pain not relieved by acetaminophen or ibuprofen  Difficulty swallowing and/or coughing up blood  Shortness of breath  Increased groin pain or a large or growing hard lump around the site.  Groin site is red, swollen, hot or tender.  Blood or fluid is draining from the groin site.  You have chills or a fever greater than 101 F (38 C).  Your leg feels numb, cool or changes color.  If groin pain is not relieved by Tylenol or Ibuprofen.  Recurrent irregular or fast heart rate lasting over 2-3 hours.  Any questions or concerns.    Heart rhythms:  You may have some irregular heartbeats. These feel very strong. They may make you feel that the A Fib is going to start again.  Give it time. The irregular beats should occur less often.    Follow Up Appointments:  October 1st at 3:20 am Eryn Serrano NP   January 6th at 10:45 am Dr Micah ZHAO [facility]   A Fib clinic RN's Annalise Renteria [phone] (Mon-Fri, 8:00-4:30)   [phone] Option 2 (7 days a week) after hours for on call Cardiologist.

## 2024-09-23 NOTE — PROGRESS NOTES
[facility] Discharge Nursing Note    Patient Information  Name: Eleno Grant  Age: 69 year old    Discharge Education:  Discharge instructions reviewed: Yes  Additional education/resources provided: none  Patient/patient representative verbalizes understanding: Yes  Patient discharging on new medications: No  Medication education completed: Yes    Discharge Plans:   Discharge location: home  Discharge ride contacted: Yes  Approximate discharge time: 7075    Discharge Criteria:  Discharge criteria met and vital signs stable: Yes  Right groin CDI with gauze and tegaderm - no bleeding no hematoma noted   CMS intact to right foot   Denies any pain   Patient ambulated to bathroom - eating and drinking without difficulty     Patient Belongs:  Patient belongings returned to patient: Yes    Feliz Caballero RN

## 2024-09-23 NOTE — ANESTHESIA CARE TRANSFER NOTE
Patient: Eleno Grant    Procedure: Procedure(s):  Ablation Atrial Fibrilation       Diagnosis: Afib  Diagnosis Additional Information: No value filed.    Anesthesia Type:   General     Note:    Oropharynx: oropharynx clear of all foreign objects and spontaneously breathing  Level of Consciousness: awake  Oxygen Supplementation: face mask  Level of Supplemental Oxygen (L/min / FiO2): 6  Independent Airway: airway patency satisfactory and stable  Dentition: dentition unchanged  Vital Signs Stable: post-procedure vital signs reviewed and stable  Report to RN Given: handoff report given  Patient transferred to: PACU    Handoff Report: Identifed the Patient, Identified the Reponsible Provider, Reviewed the pertinent medical history, Discussed the surgical course, Reviewed Intra-OP anesthesia mangement and issues during anesthesia, Set expectations for post-procedure period and Allowed opportunity for questions and acknowledgement of understanding      Vitals:  Vitals Value Taken Time   /84 09/23/24 1127   Temp     Pulse 72 09/23/24 1128   Resp 12 09/23/24 1128   SpO2 98 % 09/23/24 1128   Vitals shown include unfiled device data.    Electronically Signed By: Preethi Rubin  September 23, 2024  11:29 AM

## 2024-09-23 NOTE — PROGRESS NOTES
[facility] Post Procedure Note    Patient Information  Name: Eleno Grant  Age: 69 year old    Post Procedure  Time patient returned to [facility]: 1235  Concerns/abnormal assessment: none  If abnormal assessment, provider notified: N/A  Plan/Other:   Proceed to discharge home when patient meets criteria   Right groin - no bleeding no hematoma noted - small amount of blood under tegaderm   CMS intact to right foot   Denies any pain   Left wrist CDI with gauze and tegaderm from ej - no bleeding no hematoma noted   CMS intact to left hand    Feliz Caballero RN

## 2024-09-23 NOTE — ANESTHESIA PROCEDURE NOTES
Airway       Patient location during procedure: OR       Procedure Start/Stop Times: 9/23/2024 8:31 AM  Staff -        Anesthesiologist:  David Ureña MD       CRNA: Preethi Rubin       Performed By: CRNA  Consent for Airway        Urgency: elective  Indications and Patient Condition       Indications for airway management: sierra-procedural       Induction type:intravenous       Mask difficulty assessment: 1 - vent by mask    Final Airway Details       Final airway type: endotracheal airway       Successful airway: ETT - single and Oral  Endotracheal Airway Details        ETT size (mm): 7.5       Cuffed: yes       Successful intubation technique: direct laryngoscopy       DL Blade Type: MAC 3       Grade View of Cords: 1       Adjucts: stylet       Position: Right       Measured from: gums/teeth       Secured at (cm): 20       Bite block used: None    Post intubation assessment        Placement verified by: capnometry, equal breath sounds and chest rise        Number of attempts at approach: 1       Number of other approaches attempted: 0       Secured with: tape       Ease of procedure: easy       Dentition: Intact and Unchanged    Medication(s) Administered   Medication Administration Time: 9/23/2024 8:31 AM

## 2024-09-23 NOTE — ANESTHESIA PREPROCEDURE EVALUATION
Anesthesia Pre-Procedure Evaluation    Patient: Eleno Grant   MRN: [id number] : 1955        Procedure : Procedure(s):  Ablation Atrial Fibrilation          History reviewed. No pertinent past medical history.   History reviewed. No pertinent surgical history.   No Known Allergies   Social History     Tobacco Use     Smoking status: Former     Types: Cigarettes     Smokeless tobacco: Not on file   Substance Use Topics     Alcohol use: Yes      Wt Readings from Last 1 Encounters:   24 82.6 kg (182 lb)        Anesthesia Evaluation            ROS/MED HX  ENT/Pulmonary:     (+)                tobacco use, Past use,                    (-) sleep apnea   Neurologic:       Cardiovascular:     (+) Dyslipidemia - -   -  - -                        dysrhythmias, a-fib,        Previous cardiac testing   Echo: Date: 3/27/24 Results:  Interpretation Summary     Technically difficult study limited views obtained due to body habitus  The visual ejection fraction is estimated at 55%.  Left ventricular diastolic function is normal.  Lateral wall motion lower limits of normal, remainder of LV wall motion is  normal  Sinus rhythm was noted.  ______________________________________________________________________________  Left Ventricle  The left ventricle is normal in size. There is normal left ventricular wall  thickness. The visual ejection fraction is estimated at 55%. Left ventricular  diastolic function is normal. Lateral wall motion lower limits of normal,  remainder of LV wall motion is normal. There is no thrombus seen in the left  ventricle.     Right Ventricle  The right ventricle is normal in size and function.     Atria  Normal left atrial size. Right atrial size is normal.     Mitral Valve  The mitral valve leaflets appear normal. There is no evidence of stenosis,  fluttering, or prolapse.     Tricuspid Valve  There is physiologic tricuspid regurgitation. Doppler findings do not suggest  pulmonary  "hypertension. IVC diameter <2.1 cm collapsing >50% with sniff  suggests a normal RA pressure of 3 mmHg.     Aortic Valve  The aortic valve is trileaflet. No hemodynamically significant valvular aortic  stenosis.     Pulmonic Valve  The pulmonic valve is not well seen, but is grossly normal.     Vessels  The aortic root is normal size.     Pericardium  The pericardium appears normal.     Rhythm  Sinus rhythm was noted.    Stress Test:  Date: Results:    ECG Reviewed:  Date: Results:    Cath:  Date: Results:      METS/Exercise Tolerance:     Hematologic:       Musculoskeletal:       GI/Hepatic:    (-) GERD   Renal/Genitourinary:       Endo:       Psychiatric/Substance Use:       Infectious Disease:       Malignancy:       Other:            Physical Exam    Airway        Mallampati: II   TM distance: > 3 FB   Neck ROM: full   Mouth opening: > 3 cm    Respiratory Devices and Support         Dental       (+) Completely normal teeth      Cardiovascular          Rhythm and rate: regular and bradycardia     Pulmonary   pulmonary exam normal              OUTSIDE LABS:  CBC:   Lab Results   Component Value Date    WBC 5.0 09/23/2024    WBC 7.1 03/26/2024    HGB 13.3 09/23/2024    HGB 15.0 03/26/2024    HCT 41.4 09/23/2024    HCT 45.5 03/26/2024     09/23/2024     03/26/2024     BMP:   Lab Results   Component Value Date     09/23/2024     07/12/2024    POTASSIUM 4.4 09/23/2024    POTASSIUM 4.4 07/12/2024    CHLORIDE 104 09/23/2024    CHLORIDE 104 07/12/2024    CO2 30 (H) 09/23/2024    CO2 27 07/12/2024    BUN 16.1 09/23/2024    BUN 17.9 07/12/2024    CR 1.66 (H) 09/23/2024    CR 1.7 (H) 09/20/2024    GLC 96 09/23/2024    GLC 97 07/12/2024     COAGS: No results found for: \"PTT\", \"INR\", \"FIBR\"  POC: No results found for: \"BGM\", \"HCG\", \"HCGS\"  HEPATIC:   Lab Results   Component Value Date    ALBUMIN 4.2 03/26/2024    PROTTOTAL 6.8 03/26/2024    ALT 33 07/12/2024    AST 38 03/26/2024    ALKPHOS 70 " "03/26/2024    BILITOTAL 0.4 03/26/2024     OTHER:   Lab Results   Component Value Date    CAROLINE 9.1 09/23/2024    TSH 1.58 03/26/2024       Anesthesia Plan    ASA Status:  2    NPO Status:  NPO Appropriate    Anesthesia Type: General.   Induction: Intravenous, Propofol.   Maintenance: Inhalation.   Techniques and Equipment:     - Airway: Video-Laryngoscope       Consents    Anesthesia Plan(s) and associated risks, benefits, and realistic alternatives discussed. Questions answered and patient/representative(s) expressed understanding.     - Discussed:     - Discussed with:  Patient            Postoperative Care    Pain management: IV analgesics, Oral pain medications.   PONV prophylaxis: Ondansetron (or other 5HT-3)     Comments:               David Ureña MD    I have reviewed the pertinent notes and labs in the chart from the past 30 days and (re)examined the patient.  Any updates or changes from those notes are reflected in this note.            # Drug Induced Coagulation Defect: home medication list includes an anticoagulant medication   # Overweight: Estimated body mass index is 28.51 kg/m  as calculated from the following:    Height as of this encounter: 1.702 m (5' 7\").    Weight as of this encounter: 82.6 kg (182 lb).      "

## 2024-09-23 NOTE — ANESTHESIA POSTPROCEDURE EVALUATION
Patient: Eleno Grant    Procedure: Procedure(s):  Ablation Atrial Fibrilation       Anesthesia Type:  General    Note:  Disposition: Outpatient   Postop Pain Control: Uneventful            Sign Out: Well controlled pain   PONV: No   Neuro/Psych: Uneventful            Sign Out: Acceptable/Baseline neuro status   Airway/Respiratory: Uneventful            Sign Out: Acceptable/Baseline resp. status   CV/Hemodynamics: Uneventful            Sign Out: Acceptable CV status   Other NRE: NONE   DID A NON-ROUTINE EVENT OCCUR? No           Last vitals:  Vitals Value Taken Time   /79 09/23/24 1230   Temp 36.1  C (97  F) 09/23/24 1230   Pulse 70 09/23/24 1230   Resp 12 09/23/24 1230   SpO2 95 % 09/23/24 1233       Electronically Signed By: David Ureña MD  September 23, 2024  4:02 PM

## 2024-09-24 ENCOUNTER — TELEPHONE (OUTPATIENT)
Dept: CARDIOLOGY | Facility: CLINIC | Age: 69
End: 2024-09-24
Payer: COMMERCIAL

## 2024-09-24 NOTE — TELEPHONE ENCOUNTER
9/24/24 Called pt in follow up to Afib Ablation performed 9/23/24    Reviewed discharge instructions with patient    Pain level is 0    Groin dressing is C/D and I with plans to remove later this afternoon and shower . Discussed woud care and reasons to call AF RN     Breathing feels comfortable     Pt is eating, drinking and urinating without difficulty . LBM was pre-procedure . He stated he is passing gas.     Reviewed activity instructions    Reviewed any medication changes - Stop Metoprolol, start Amiodarone, cont Eliquis     Discussed that possible Afib episodes may occur and to call if > 2 hours in duration or if symptomatic or sustained HR > 120    Discussed reasons to call Afib RN    Reminded of follow up appointments    Verified pt has Afib RN and after hours Cardiology phone numbers    Pt voiced understanding and has no further questions/concerns at this time.    Delia  145pm

## 2024-10-01 ENCOUNTER — OFFICE VISIT (OUTPATIENT)
Dept: CARDIOLOGY | Facility: CLINIC | Age: 69
End: 2024-10-01
Payer: COMMERCIAL

## 2024-10-01 VITALS
HEART RATE: 62 BPM | DIASTOLIC BLOOD PRESSURE: 82 MMHG | BODY MASS INDEX: 28.19 KG/M2 | SYSTOLIC BLOOD PRESSURE: 130 MMHG | HEIGHT: 68 IN | WEIGHT: 186 LBS

## 2024-10-01 DIAGNOSIS — I48.0 PAROXYSMAL ATRIAL FIBRILLATION (H): Primary | ICD-10-CM

## 2024-10-01 PROCEDURE — 93000 ELECTROCARDIOGRAM COMPLETE: CPT | Performed by: NURSE PRACTITIONER

## 2024-10-01 PROCEDURE — 99213 OFFICE O/P EST LOW 20 MIN: CPT | Performed by: NURSE PRACTITIONER

## 2024-10-01 PROCEDURE — G2211 COMPLEX E/M VISIT ADD ON: HCPCS | Performed by: NURSE PRACTITIONER

## 2024-10-01 NOTE — LETTER
10/1/2024    Logan Miller MD  [address]  [facility] [address]    RE: Eleno Grant       Dear Colleague,     I had the pleasure of seeing Eleno Grant in the [facility].    Electrophysiology Clinic Progress Note  Eleno Grant MRN# [id number]   YOB: 1955 Age: 69 year old     Primary cardiologist: Dr. Bates (general) and Dr. Obrien (EP)    Reason for visit: Atrial fibrillation     History of presenting illness:    Eleno Grant is a pleasant 69 year old patient with past medical history significant for:    Paroxysmal atrial fibrillation: Diagnosed in 3/2024 with AF with RVR int he ED. s/p PVI ablation with Dr. Obrien 9/23/2024. Started on amiodarone 200 mg daily post procedure and metoprolol discontinued   [id number] Score 1 (age) currently on Eliquis   Hyperlipidemia  DANETTE  Syncope: in 3/2024 while in [place]. ED work up was unremarkable.   CAC: Calcium score in 3/2023 at 249 putting him in the 66th percentile     Today he returns for a 1 week follow-up post PVI ablation with Dr. Obrien.  Since the procedure has no recurrent AF and since stopping metoprolol he has felt he had more energy.  He is currently tolerating amiodarone well.  There were no concerns with his right femoral vein access site.  His left radial arterial line site has a small hematoma and improved bruising.    Diagnotic studies:  EKG 10/1/2024 SR at 61 bpm, , , QTc 439 and QRS duration 92  CT showed no significant LA/YAMILETH thrombus  EKG 8/1/2024: Sinus rhythm rate 64 bpm.  , QRS 96, QTc 415 ms.  Stress test 5/2024: This study showed no evidence of inducible myocardial ischemia. Run of wide-complex tachycardia, possibly SVT with aberrancy   EKG 4/24/2024-sinus bradycardia rate 51 bpm.  , QRS 96, QTc 409 ms.  EKG 3/26/2024-atrial fibrillation rate 141 bpm  Event monitor 3/2024: SR with occasional PVCs and no AF  Echocardiogram 3/2024: LVEF 55% with  "normal DD            Assessment and Plan:     ASSESSMENT:    Paroxysmal atrial fibrillation  Diagnosed in 3/2024 with AF with RVR int he ED. s/p PVI ablation with Dr. Obrien 9/23/2024. Started on amiodarone 200 mg daily post procedure and metoprolol discontinued   [id number] Score 1 (age) currently on Eliquis     PLAN:     Remain on amiodarone and Eliquis until 3-month follow-up with Dr. Obrien as scheduled in January  Patient reminded when to call with concerns for recurrent AF or other symptoms.     Orders this Visit:  Orders Placed This Encounter   Procedures     EKG 12-lead complete w/read - Clinics (performed today)     No orders of the defined types were placed in this encounter.    There are no discontinued medications.    Today's clinic visit entailed:  Review of the result(s) of each unique test - Echo, EKG, Event monitor, Stress, Ablation   The following tests were independently interpreted by me as noted in my documentation: EKG  I spent a total of 24 minutes on the day of the visit.   Time spent by me doing chart review, history and exam, documentation and further activities per the note  Provider  Link to [facility] Help Grid     The level of medical decision making during this visit was of moderate complexity.      The longitudinal plan of care for the diagnosis(es)/condition(s) as documented were addressed during this visit. Due to the added complexity in care, I will continue to support Eleno Grant in the subsequent management and with ongoing continuity of care.          Review of Systems:     Review of Systems:  Skin:        Eyes:       ENT:       Respiratory:  Positive for CPAP;sleep apnea  Cardiovascular:    palpitations;Positive for;fatigue  Gastroenterology:      Genitourinary:       Musculoskeletal:       Neurologic:       Psychiatric:       Heme/Lymph/Imm:       Endocrine:  Negative thyroid disorder;diabetes            Physical Exam:     Vitals: /82   Pulse 62   Ht 1.727 m (5' 8\")   " Wt 84.4 kg (186 lb)   BMI 28.28 kg/m    Constitutional: Well nourished and in no apparent distress.  Eyes: Pupils equal, round.   HEENT: Normocephalic, atraumatic.   Neck: Supple.   Respiratory: Breathing non-labored. Lungs clear to auscultation bilaterally.  Cardiovascular:  Regular rate and rhythm, normal S1 and S2. No murmur   Skin: Warm, dry.   Extremities: No edema. RFV site CDI  Neurologic: No gross motor deficits. Alert, awake, and oriented to person, place and time.  Psychiatric: Affect appropriate.        CURRENT MEDICATIONS:  Current Outpatient Medications   Medication Sig Dispense Refill     amiodarone (PACERONE) 200 MG tablet Take 1 tablet (200 mg) by mouth daily. 90 tablet 0     apixaban ANTICOAGULANT (ELIQUIS) 5 MG tablet Take 1 tablet (5 mg) by mouth 2 times daily 180 tablet 4     desvenlafaxine (PRISTIQ) 50 MG 24 hr tablet Take 50 mg by mouth daily       ezetimibe (ZETIA) 10 MG tablet Take 10 mg by mouth daily       rosuvastatin (CRESTOR) 40 MG tablet Take 40 mg by mouth daily       tamsulosin (FLOMAX) 0.4 MG capsule Take 0.4 mg by mouth daily         ALLERGIES  No Known Allergies      PAST MEDICAL HISTORY:  History reviewed. No pertinent past medical history.    PAST SURGICAL HISTORY:  Past Surgical History:   Procedure Laterality Date     EP ABLATION FOCAL AFIB N/A 9/23/2024    Procedure: Ablation Atrial Fibrilation;  Surgeon: Rancho Obrien MD;  Location:  HEART CARDIAC CATH LAB       FAMILY HISTORY:  History reviewed. No pertinent family history.    SOCIAL HISTORY:  Social History     Socioeconomic History     Marital status:      Spouse name: None     Number of children: None     Years of education: None     Highest education level: None   Tobacco Use     Smoking status: Former     Types: Cigarettes     Smokeless tobacco: Never   Substance and Sexual Activity     Alcohol use: Yes     Comment: 1 drink 4 nights per week     Social Determinants of Health     Financial Resource Strain: Low  Risk  (8/29/2023)    [facility] from [facility], [facility]    Financial Resource Strain      Difficulty of Paying Living Expenses: 3   Food Insecurity: No Food Insecurity (8/29/2023)    Received from [facility], [facility]    Food Insecurity      Worried About Running Out of Food in the Last Year: 1   Transportation Needs: No Transportation Needs (8/29/2023)    Received from [facility], [facility]    Transportation Needs      Lack of Transportation (Medical): 1    Received from [facility]    Social Connections   Interpersonal Safety: Low Risk  (9/23/2024)    Interpersonal Safety      Do you feel physically and emotionally safe where you currently live?: Yes      Within the past 12 months, have you been hit, slapped, kicked or otherwise physically hurt by someone?: No      Within the past 12 months, have you been humiliated or emotionally abused in other ways by your partner or ex-partner?: No   Housing Stability: Low Risk  (8/29/2023)    Received from [facility], [facility]    Housing Stability      Unable to Pay for Housing in the Last Year: 1               Thank you for allowing me to participate in the care of your patient.      Sincerely,     DANIEL Sharif [facility]  cc:   Logan Miller MD  [address]  [place] [address]

## 2024-10-01 NOTE — PROGRESS NOTES
Electrophysiology Clinic Progress Note  Eleno Grant MRN# [id number]   YOB: 1955 Age: 69 year old     Primary cardiologist: Dr. Bates (general) and Dr. Obrien (EP)    Reason for visit: Atrial fibrillation     History of presenting illness:    Eleno Grant is a pleasant 69 year old patient with past medical history significant for:    Paroxysmal atrial fibrillation: Diagnosed in 3/2024 with AF with RVR int he ED. s/p PVI ablation with Dr. Obrien 9/23/2024. Started on amiodarone 200 mg daily post procedure and metoprolol discontinued   [id number] Score 1 (age) currently on Eliquis   Hyperlipidemia  DANETTE  Syncope: in 3/2024 while in [place]. ED work up was unremarkable.   CAC: Calcium score in 3/2023 at 249 putting him in the 66th percentile     Today he returns for a 1 week follow-up post PVI ablation with Dr. Obrien.  Since the procedure has no recurrent AF and since stopping metoprolol he has felt he had more energy.  He is currently tolerating amiodarone well.  There were no concerns with his right femoral vein access site.  His left radial arterial line site has a small hematoma and improved bruising.    Diagnotic studies:  EKG 10/1/2024 SR at 61 bpm, , , QTc 439 and QRS duration 92  CT showed no significant LA/YAMILETH thrombus  EKG 8/1/2024: Sinus rhythm rate 64 bpm.  , QRS 96, QTc 415 ms.  Stress test 5/2024: This study showed no evidence of inducible myocardial ischemia. Run of wide-complex tachycardia, possibly SVT with aberrancy   EKG 4/24/2024-sinus bradycardia rate 51 bpm.  , QRS 96, QTc 409 ms.  EKG 3/26/2024-atrial fibrillation rate 141 bpm  Event monitor 3/2024: SR with occasional PVCs and no AF  Echocardiogram 3/2024: LVEF 55% with normal DD            Assessment and Plan:     ASSESSMENT:    Paroxysmal atrial fibrillation  Diagnosed in 3/2024 with AF with RVR int he ED. s/p PVI ablation with Dr. Obrien 9/23/2024. Started on amiodarone 200 mg daily post  "procedure and metoprolol discontinued   [id number] Score 1 (age) currently on Eliquis     PLAN:     Remain on amiodarone and Eliquis until 3-month follow-up with Dr. Obrien as scheduled in January  Patient reminded when to call with concerns for recurrent AF or other symptoms.     Orders this Visit:  Orders Placed This Encounter   Procedures    EKG 12-lead complete w/read - Clinics (performed today)     No orders of the defined types were placed in this encounter.    There are no discontinued medications.    Today's clinic visit entailed:  Review of the result(s) of each unique test - Echo, EKG, Event monitor, Stress, Ablation   The following tests were independently interpreted by me as noted in my documentation: EKG  I spent a total of 24 minutes on the day of the visit.   Time spent by me doing chart review, history and exam, documentation and further activities per the note  Provider  Link to [facility] Help Grid     The level of medical decision making during this visit was of moderate complexity.      The longitudinal plan of care for the diagnosis(es)/condition(s) as documented were addressed during this visit. Due to the added complexity in care, I will continue to support Eleno Grant in the subsequent management and with ongoing continuity of care.          Review of Systems:     Review of Systems:  Skin:        Eyes:       ENT:       Respiratory:  Positive for CPAP;sleep apnea  Cardiovascular:    palpitations;Positive for;fatigue  Gastroenterology:      Genitourinary:       Musculoskeletal:       Neurologic:       Psychiatric:       Heme/Lymph/Imm:       Endocrine:  Negative thyroid disorder;diabetes            Physical Exam:     Vitals: /82   Pulse 62   Ht 1.727 m (5' 8\")   Wt 84.4 kg (186 lb)   BMI 28.28 kg/m    Constitutional: Well nourished and in no apparent distress.  Eyes: Pupils equal, round.   HEENT: Normocephalic, atraumatic.   Neck: Supple.   Respiratory: Breathing non-labored. Lungs " clear to auscultation bilaterally.  Cardiovascular:  Regular rate and rhythm, normal S1 and S2. No murmur   Skin: Warm, dry.   Extremities: No edema. RFV site CDI  Neurologic: No gross motor deficits. Alert, awake, and oriented to person, place and time.  Psychiatric: Affect appropriate.        CURRENT MEDICATIONS:  Current Outpatient Medications   Medication Sig Dispense Refill    amiodarone (PACERONE) 200 MG tablet Take 1 tablet (200 mg) by mouth daily. 90 tablet 0    apixaban ANTICOAGULANT (ELIQUIS) 5 MG tablet Take 1 tablet (5 mg) by mouth 2 times daily 180 tablet 4    desvenlafaxine (PRISTIQ) 50 MG 24 hr tablet Take 50 mg by mouth daily      ezetimibe (ZETIA) 10 MG tablet Take 10 mg by mouth daily      rosuvastatin (CRESTOR) 40 MG tablet Take 40 mg by mouth daily      tamsulosin (FLOMAX) 0.4 MG capsule Take 0.4 mg by mouth daily         ALLERGIES  No Known Allergies      PAST MEDICAL HISTORY:  History reviewed. No pertinent past medical history.    PAST SURGICAL HISTORY:  Past Surgical History:   Procedure Laterality Date    EP ABLATION FOCAL AFIB N/A 9/23/2024    Procedure: Ablation Atrial Fibrilation;  Surgeon: Rancho Obrien MD;  Location: [facility] CARDIAC CATH LAB       FAMILY HISTORY:  History reviewed. No pertinent family history.    SOCIAL HISTORY:  Social History     Socioeconomic History    Marital status:      Spouse name: None    Number of children: None    Years of education: None    Highest education level: None   Tobacco Use    Smoking status: Former     Types: Cigarettes    Smokeless tobacco: Never   Substance and Sexual Activity    Alcohol use: Yes     Comment: 1 drink 4 nights per week     Social Determinants of Health     Financial Resource Strain: Low Risk  (8/29/2023)    [facility]    Financial Resource Strain     Difficulty of Paying Living Expenses: 3   Food Insecurity: No Food Insecurity  (8/29/2023)    Received from [facility], [facility]    Food Insecurity     Worried About Running Out of Food in the Last Year: 1   Transportation Needs: No Transportation Needs (8/29/2023)    Received from [facility], [facility]    Transportation Needs     Lack of Transportation (Medical): 1    Received from [facility]    Social Connections   Interpersonal Safety: Low Risk  (9/23/2024)    Interpersonal Safety     Do you feel physically and emotionally safe where you currently live?: Yes     Within the past 12 months, have you been hit, slapped, kicked or otherwise physically hurt by someone?: No     Within the past 12 months, have you been humiliated or emotionally abused in other ways by your partner or ex-partner?: No   Housing Stability: Low Risk  (8/29/2023)    Received from [facility], [facility]    Housing Stability     Unable to Pay for Housing in the Last Year: 1

## 2024-10-22 ENCOUNTER — TELEPHONE (OUTPATIENT)
Dept: CARDIOLOGY | Facility: CLINIC | Age: 69
End: 2024-10-22
Payer: COMMERCIAL

## 2024-10-22 NOTE — TELEPHONE ENCOUNTER
Faxed signed medical consultation form to [facility]  Sent copy to [facility] to scan.  Copy in EP filing cabinet.  JERALD Martins

## 2024-12-11 ENCOUNTER — TELEPHONE (OUTPATIENT)
Dept: CARDIOLOGY | Facility: CLINIC | Age: 69
End: 2024-12-11
Payer: COMMERCIAL

## 2024-12-11 NOTE — TELEPHONE ENCOUNTER
Good Morning Team 2,  There is an order in patient's chart for pt to see Dr. Bates, this was due in August. Pt is going to see Dr. Obrien.    Does this pt need a followup? If not, can you please remove the order.    Thank you!    [place]

## 2024-12-11 NOTE — TELEPHONE ENCOUNTER
Per Dr. Bates's LOV note on 4/24/24, he would like follow up with patient down the road. Patient should still see general cardiology. Routed to [facility] in scheduling

## 2024-12-31 NOTE — PROGRESS NOTES
[facility] HEART CLINIC  Cardiac Electrophysiology Clinic    Eleno Grant MRN# [id number]   YOB: 1955 Age: 69 year old       I had the pleasure of seeing Eleno Grant  who is a 69 year old male with history of hyperlipidemia, DANETTE, and paroxysmal atrial fibrillation s/p PVI ablation (9/23/2024)  The patient had an episode of syncope in 3/2024 while he was in [place].  He reported having dizziness/lightheadedness prior to passing out and did hit his head.  He was evaluated in the ED, and workup was unremarkable.  After returning to [place] he had another episode of severe lightheadedness and was evaluated in the ED again.  He was found to be in rapid atrial fibrillation and converted spontaneously.  He was started on low-dose metoprolol.  He had a 30-day event monitor which did not show any recurrent atrial fibrillation.  His baseline EKG showed sinus bradycardia with rate of 51 bpm.  I initially met him in clinic on 8/1/2024.  He reported occasional lightheadedness as well as fluttering in his chest which correlated with atrial fibrillation noted on his smart watch.  He was having almost daily episodes.  He does have baseline sinus bradycardia which has limited his metoprolol dosage.   He underwent pulmonary vein isolation on 9/23/2024.  He started amiodarone 200 mg daily postop and stopped his metoprolol.      Today's Visit:  He has been doing well since the ablation and has not had any recurrent atrial fibrillation.  He does still occasionally wear his Apple Watch which has not alerted him to any episodes of A-fib either.  He has noted occasional lightheadedness, may once every 1-2 weeks.  Usually occurs when he first gets up in the morning and lasts less than a minute.  No syncope or falls.  No Afib recurrence.  Nothing recorded on Apple watch.    No chest pain or shortness of breath.    He does still feel tired, not sure if it is due to sleep apnea or medications.  No  bleeding issues on Eqliuis.    He is not tolerating CPAP, not able to sleep with it, and looking into getting a dental device.          Diagnostic Testing:  EKG today, which I overread, showed Sinus rhythm rate 64 bpm.  , QRS 92, QTc 420 ms.  EKG 10/1/2024-sinus rhythm rate 61 bpm.  , QRS 92, QTc 440 ms.  EKG 9/23/2024-sinus rhythm rate 75 bpm.  , QRS 84, QTc 477 ms  EKG 8/1/2024 - Sinus rhythm rate 64 bpm.  , QRS 96, QTc 415 ms.  EKG 4/24/2024-sinus bradycardia rate 51 bpm.  , QRS 96, QTc 409 ms.  EKG 3/26/2024-atrial fibrillation rate 141 bpm     Event Monitor 3/2024 - personally reviewed  Sinus rhythm with occasional PVCs  No A-fib recorded     Echocardiogram 3/27/2024    Technically difficult study limited views obtained due to body habitus  The visual ejection fraction is estimated at 55%.  Left ventricular diastolic function is normal.  Lateral wall motion lower limits of normal, remainder of LV wall motion is normal  Sinus rhythm was noted.     Stress Testing 5/23/2024  Normal resting heart rate and blood pressure with normal response to exercise.  No chest pain with exercise  High normal exercise capacity for age  Normal resting ECG, no ischemic changes seen with exercise.  At 1:26 recovery the patient developed asymptomatic, regular, wide complex  tachycardia, rate 184 to 193 bpm. This is most likely SVT with aberrancy,  although VT cannot completely excluded. The arrhythmia lasted about 50  seconds, resolving spontaneously  Normal resting LV function with good augmentation of all wall segments post exercise.  This study showed no evidence of inducible myocardial ischemia.        Last Comprehensive Metabolic Panel:  Lab Results   Component Value Date     09/23/2024    POTASSIUM 4.4 09/23/2024    CHLORIDE 104 09/23/2024    CO2 30 (H) 09/23/2024    ANIONGAP 6 (L) 09/23/2024    GLC 96 09/23/2024    BUN 16.1 09/23/2024    CR 1.66 (H) 09/23/2024    GFRESTIMATED 45 (L)  09/23/2024    CAROLINE 9.1 09/23/2024        Magnesium   Date Value Ref Range Status   09/23/2024 2.2 1.7 - 2.3 mg/dL Final        TSH   Date Value Ref Range Status   03/26/2024 1.58 0.30 - 4.20 uIU/mL Final            Assessment/Plan:  69 year old male with history of hyperlipidemia, DANETTE, and paroxysmal atrial fibrillation s/p PVI.    Echocardiogram showed preserved LV function, no significant valvular disease, and normal atrial size.  His stress test was negative for ischemia but he did have a run of wide-complex tachycardia, possibly SVT with aberrancy.  His EKG today shows sinus rhythm with normal intervals.      He has been doing well since the ablation and has not had any recurrent atrial fibrillation.  He did have sinus bradycardia while on metoprolol, which I thought may have contributed to his dizziness, so we stopped this after his ablation procedure.  He is now more than 3 months out from his ablation, so I recommended discontinuing amiodarone.  I encouraged him to contact us if he has any recurrent atrial fibrillation or other concerns.    The patient's CHADS VASc is 1 (age), which does not warrant long-term anticoagulation at this time.  I recommended stopping Eliquis at this time.       Stop amiodarone and Eliquis  Routine follow up in 6 months       Rancho Obrien MD        Orders this Visit:  Orders Placed This Encounter   Procedures    EKG 12-lead complete w/read - Clinics (performed today)     No orders of the defined types were placed in this encounter.    Medications Discontinued During This Encounter   Medication Reason    amiodarone (PACERONE) 200 MG tablet     apixaban ANTICOAGULANT (ELIQUIS) 5 MG tablet        Encounter Diagnoses   Name Primary?    Paroxysmal atrial fibrillation (H) Yes    Sinus bradycardia     Lightheadedness      Today's clinic visit entailed:  Review of the result(s) of each unique test - echo  The following tests were independently interpreted by me as noted in my documentation:  "EKG  30 minutes spent by me on the date of the encounter doing chart review, history and exam, documentation and further activities per the note      CURRENT MEDICATIONS:  Current Outpatient Medications   Medication Sig Dispense Refill    amiodarone (PACERONE) 200 MG tablet Take 1 tablet (200 mg) by mouth daily. 90 tablet 0    apixaban ANTICOAGULANT (ELIQUIS) 5 MG tablet Take 1 tablet (5 mg) by mouth 2 times daily 180 tablet 4    desvenlafaxine (PRISTIQ) 50 MG 24 hr tablet Take 50 mg by mouth daily      ezetimibe (ZETIA) 10 MG tablet Take 10 mg by mouth daily      rosuvastatin (CRESTOR) 40 MG tablet Take 40 mg by mouth daily      tamsulosin (FLOMAX) 0.4 MG capsule Take 0.4 mg by mouth daily         Review of Systems:  Pertinent review of system as stated above in HPI    Skin:        Eyes:       ENT:       Respiratory:       Cardiovascular:       Gastroenterology:      Genitourinary:       Musculoskeletal:       Neurologic:       Psychiatric:       Heme/Lymph/Imm:       Endocrine:         Physical Exam:  Vitals: /81   Pulse 70   Resp 18   Ht 1.727 m (5' 8\")   Wt 83 kg (183 lb)   BMI 27.83 kg/m      Constitutional: Pleasant, no apparent distress.  Respiratory: Breathing non-labored.   Cardiovascular:  Regular rate and rhythm  Neurologic: No gross motor deficits.  Psychiatric: Affect appropriate.        ALLERGIES  No Known Allergies    PAST MEDICAL HISTORY:  No past medical history on file.    PAST SURGICAL HISTORY:  Past Surgical History:   Procedure Laterality Date    EP ABLATION FOCAL AFIB N/A 9/23/2024    Procedure: Ablation Atrial Fibrilation;  Surgeon: Rancho Obrien MD;  Location:  HEART CARDIAC CATH LAB       FAMILY HISTORY:  No family history on file.    SOCIAL HISTORY:  Social History     Socioeconomic History    Marital status:    Tobacco Use    Smoking status: Former     Types: Cigarettes    Smokeless tobacco: Never   Substance and Sexual Activity    Alcohol use: Yes     Comment: 1 drink 4 " nights per week     Social Drivers of Health     Financial Resource Strain: Low Risk  (8/29/2023)    [facility]    Financial Resource Strain     Difficulty of Paying Living Expenses: 3   Food Insecurity: No Food Insecurity (8/29/2023)    [facility], [facility]    Food Insecurity     Worried About Running Out of Food in the Last Year: 1   Transportation Needs: No Transportation Needs (8/29/2023)    Received from [facility], [facility]    Transportation Needs     Lack of Transportation (Medical): 1    Received from [facility]    Social Connections   Interpersonal Safety: Low Risk  (9/23/2024)    Interpersonal Safety     Do you feel physically and emotionally safe where you currently live?: Yes     Within the past 12 months, have you been hit, slapped, kicked or otherwise physically hurt by someone?: No     Within the past 12 months, have you been humiliated or emotionally abused in other ways by your partner or ex-partner?: No   Housing Stability: Low Risk  (8/29/2023)    [facility] from [facility], [facility]    Housing Stability     Unable to Pay for Housing in the Last Year: 1             CC  No referring provider defined for this encounter.

## 2025-01-06 ENCOUNTER — OFFICE VISIT (OUTPATIENT)
Dept: CARDIOLOGY | Facility: CLINIC | Age: 70
End: 2025-01-06
Payer: COMMERCIAL

## 2025-01-06 VITALS
HEIGHT: 68 IN | BODY MASS INDEX: 27.74 KG/M2 | RESPIRATION RATE: 18 BRPM | WEIGHT: 183 LBS | DIASTOLIC BLOOD PRESSURE: 81 MMHG | SYSTOLIC BLOOD PRESSURE: 119 MMHG | HEART RATE: 70 BPM

## 2025-01-06 DIAGNOSIS — R42 LIGHTHEADEDNESS: ICD-10-CM

## 2025-01-06 DIAGNOSIS — R00.1 SINUS BRADYCARDIA: ICD-10-CM

## 2025-01-06 DIAGNOSIS — I48.0 PAROXYSMAL ATRIAL FIBRILLATION (H): Primary | ICD-10-CM

## 2025-01-06 PROCEDURE — 99214 OFFICE O/P EST MOD 30 MIN: CPT | Performed by: INTERNAL MEDICINE

## 2025-01-06 PROCEDURE — 93000 ELECTROCARDIOGRAM COMPLETE: CPT | Performed by: INTERNAL MEDICINE

## 2025-01-06 NOTE — LETTER
1/6/2025    Logan Miller MD  [address]  [facility] [address]    RE: Eleno Grant       Dear Colleague,     I had the pleasure of seeing Eleno Grant in the [facility].  [facility] HEART [facility]  Cardiac Electrophysiology Clinic    Eleno Grant MRN# [id number]   YOB: 1955 Age: 69 year old       I had the pleasure of seeing Eleno Grant  who is a 69 year old male with history of hyperlipidemia, DANETTE, and paroxysmal atrial fibrillation s/p PVI ablation (9/23/2024)  The patient had an episode of syncope in 3/2024 while he was in [place].  He reported having dizziness/lightheadedness prior to passing out and did hit his head.  He was evaluated in the ED, and workup was unremarkable.  After returning to [place] he had another episode of severe lightheadedness and was evaluated in the ED again.  He was found to be in rapid atrial fibrillation and converted spontaneously.  He was started on low-dose metoprolol.  He had a 30-day event monitor which did not show any recurrent atrial fibrillation.  His baseline EKG showed sinus bradycardia with rate of 51 bpm.  I initially met him in clinic on 8/1/2024.  He reported occasional lightheadedness as well as fluttering in his chest which correlated with atrial fibrillation noted on his smart watch.  He was having almost daily episodes.  He does have baseline sinus bradycardia which has limited his metoprolol dosage.   He underwent pulmonary vein isolation on 9/23/2024.  He started amiodarone 200 mg daily postop and stopped his metoprolol.      Today's Visit:  He has been doing well since the ablation and has not had any recurrent atrial fibrillation.  He does still occasionally wear his Apple Watch which has not alerted him to any episodes of A-fib either.  He has noted occasional lightheadedness, may once every 1-2 weeks.  Usually occurs when he first gets up in the morning and lasts less than  a minute.  No syncope or falls.  No Afib recurrence.  Nothing recorded on Apple watch.    No chest pain or shortness of breath.    He does still feel tired, not sure if it is due to sleep apnea or medications.  No bleeding issues on Eqliuis.    He is not tolerating CPAP, not able to sleep with it, and looking into getting a dental device.          Diagnostic Testing:  EKG today, which I overread, showed Sinus rhythm rate 64 bpm.  , QRS 92, QTc 420 ms.  EKG 10/1/2024-sinus rhythm rate 61 bpm.  , QRS 92, QTc 440 ms.  EKG 9/23/2024-sinus rhythm rate 75 bpm.  , QRS 84, QTc 477 ms  EKG 8/1/2024 - Sinus rhythm rate 64 bpm.  , QRS 96, QTc 415 ms.  EKG 4/24/2024-sinus bradycardia rate 51 bpm.  , QRS 96, QTc 409 ms.  EKG 3/26/2024-atrial fibrillation rate 141 bpm     Event Monitor 3/2024 - personally reviewed  Sinus rhythm with occasional PVCs  No A-fib recorded     Echocardiogram 3/27/2024    Technically difficult study limited views obtained due to body habitus  The visual ejection fraction is estimated at 55%.  Left ventricular diastolic function is normal.  Lateral wall motion lower limits of normal, remainder of LV wall motion is normal  Sinus rhythm was noted.     Stress Testing 5/23/2024  Normal resting heart rate and blood pressure with normal response to exercise.  No chest pain with exercise  High normal exercise capacity for age  Normal resting ECG, no ischemic changes seen with exercise.  At 1:26 recovery the patient developed asymptomatic, regular, wide complex  tachycardia, rate 184 to 193 bpm. This is most likely SVT with aberrancy,  although VT cannot completely excluded. The arrhythmia lasted about 50  seconds, resolving spontaneously  Normal resting LV function with good augmentation of all wall segments post exercise.  This study showed no evidence of inducible myocardial ischemia.        Last Comprehensive Metabolic Panel:  Lab Results   Component Value Date      09/23/2024    POTASSIUM 4.4 09/23/2024    CHLORIDE 104 09/23/2024    CO2 30 (H) 09/23/2024    ANIONGAP 6 (L) 09/23/2024    GLC 96 09/23/2024    BUN 16.1 09/23/2024    CR 1.66 (H) 09/23/2024    GFRESTIMATED 45 (L) 09/23/2024    CAROLINE 9.1 09/23/2024        Magnesium   Date Value Ref Range Status   09/23/2024 2.2 1.7 - 2.3 mg/dL Final        TSH   Date Value Ref Range Status   03/26/2024 1.58 0.30 - 4.20 uIU/mL Final            Assessment/Plan:  69 year old male with history of hyperlipidemia, DANETTE, and paroxysmal atrial fibrillation s/p PVI.    Echocardiogram showed preserved LV function, no significant valvular disease, and normal atrial size.  His stress test was negative for ischemia but he did have a run of wide-complex tachycardia, possibly SVT with aberrancy.  His EKG today shows sinus rhythm with normal intervals.      He has been doing well since the ablation and has not had any recurrent atrial fibrillation.  He did have sinus bradycardia while on metoprolol, which I thought may have contributed to his dizziness, so we stopped this after his ablation procedure.  He is now more than 3 months out from his ablation, so I recommended discontinuing amiodarone.  I encouraged him to contact us if he has any recurrent atrial fibrillation or other concerns.    The patient's CHADS VASc is 1 (age), which does not warrant long-term anticoagulation at this time.  I recommended stopping Eliquis at this time.       Stop amiodarone and Eliquis  Routine follow up in 6 months       Rancho Obrien MD        Orders this Visit:  Orders Placed This Encounter   Procedures     EKG 12-lead complete w/read - Clinics (performed today)     No orders of the defined types were placed in this encounter.    Medications Discontinued During This Encounter   Medication Reason     amiodarone (PACERONE) 200 MG tablet      apixaban ANTICOAGULANT (ELIQUIS) 5 MG tablet        Encounter Diagnoses   Name Primary?     Paroxysmal atrial fibrillation (H) Yes  "    Sinus bradycardia      Lightheadedness      Today's clinic visit entailed:  Review of the result(s) of each unique test - echo  The following tests were independently interpreted by me as noted in my documentation: EKG  30 minutes spent by me on the date of the encounter doing chart review, history and exam, documentation and further activities per the note      CURRENT MEDICATIONS:  Current Outpatient Medications   Medication Sig Dispense Refill     amiodarone (PACERONE) 200 MG tablet Take 1 tablet (200 mg) by mouth daily. 90 tablet 0     apixaban ANTICOAGULANT (ELIQUIS) 5 MG tablet Take 1 tablet (5 mg) by mouth 2 times daily 180 tablet 4     desvenlafaxine (PRISTIQ) 50 MG 24 hr tablet Take 50 mg by mouth daily       ezetimibe (ZETIA) 10 MG tablet Take 10 mg by mouth daily       rosuvastatin (CRESTOR) 40 MG tablet Take 40 mg by mouth daily       tamsulosin (FLOMAX) 0.4 MG capsule Take 0.4 mg by mouth daily         Review of Systems:  Pertinent review of system as stated above in HPI    Skin:        Eyes:       ENT:       Respiratory:       Cardiovascular:       Gastroenterology:      Genitourinary:       Musculoskeletal:       Neurologic:       Psychiatric:       Heme/Lymph/Imm:       Endocrine:         Physical Exam:  Vitals: /81   Pulse 70   Resp 18   Ht 1.727 m (5' 8\")   Wt 83 kg (183 lb)   BMI 27.83 kg/m      Constitutional: Pleasant, no apparent distress.  Respiratory: Breathing non-labored.   Cardiovascular:  Regular rate and rhythm  Neurologic: No gross motor deficits.  Psychiatric: Affect appropriate.        ALLERGIES  No Known Allergies    PAST MEDICAL HISTORY:  No past medical history on file.    PAST SURGICAL HISTORY:  Past Surgical History:   Procedure Laterality Date     EP ABLATION FOCAL AFIB N/A 9/23/2024    Procedure: Ablation Atrial Fibrilation;  Surgeon: Rancho Obrien MD;  Location:  HEART CARDIAC CATH LAB       FAMILY HISTORY:  No family history on file.    SOCIAL HISTORY:  Social " History     Socioeconomic History     Marital status:    Tobacco Use     Smoking status: Former     Types: Cigarettes     Smokeless tobacco: Never   Substance and Sexual Activity     Alcohol use: Yes     Comment: 1 drink 4 nights per week     Social Drivers of Health     Financial Resource Strain: Low Risk  (8/29/2023)    [facility], [facility]    Financial Resource Strain      Difficulty of Paying Living Expenses: 3   Food Insecurity: No Food Insecurity (8/29/2023)    Received from [facility], [facility]    Food Insecurity      Worried About Running Out of Food in the Last Year: 1   Transportation Needs: No Transportation Needs (8/29/2023)    Received from [facility], [facility]    Transportation Needs      Lack of Transportation (Medical): 1    Received from [facility]    Social Connections   Interpersonal Safety: Low Risk  (9/23/2024)    Interpersonal Safety      Do you feel physically and emotionally safe where you currently live?: Yes      Within the past 12 months, have you been hit, slapped, kicked or otherwise physically hurt by someone?: No      Within the past 12 months, have you been humiliated or emotionally abused in other ways by your partner or ex-partner?: No   Housing Stability: Low Risk  (8/29/2023)    Received from [facility], [facility]    Housing Stability      Unable to Pay for Housing in the Last Year: 1             CC  No referring provider defined for this encounter.      Thank you for allowing me to participate in the care of your patient.      Sincerely,     Rancho Obrien MD     [facility]  cc:   Logan  MD Paul  [address]  [address]  [place] [address]

## (undated) DEVICE — PATCH CARTO 3 EXTERNAL REFERENCE 3D MAPPING CREFP6

## (undated) DEVICE — INTRODUCER SHEATH FAST-CATH 9FRX12CM 406116

## (undated) DEVICE — CATH ABLATION 8FR 115CM D-F CURVE BI-DIR QDOT MICRO D139505

## (undated) DEVICE — CATH EP 6FR 2MM TIP 2-8-2 115C

## (undated) DEVICE — PACK EP SRG PROC LF DISP SAN32EPFSR

## (undated) DEVICE — BAYLIS STEERABLE ACCESS SOLUTIOIN - SHEATH AND RF WIRE

## (undated) DEVICE — ELECTRODE DEFIB CADENCE 22550R

## (undated) DEVICE — TUBE SET SMARKABLATE IRRIGATION

## (undated) DEVICE — CATH SOUNDSTAR 8FRX90CM 10439011

## (undated) DEVICE — CATH NAV PENTARAY F CURVE

## (undated) DEVICE — INTRODUCER SHEATH GREEN 6.5FRX11CM .038IN PSI-6F-11-038ACT

## (undated) DEVICE — CLOSURE DEVICE 6FR VASC PROGLIDE MEDICATED SUTURE 12673-03

## (undated) DEVICE — PROBE TEMP CIRCA S-CATH M ESPH 12-SNSR 3D MAP CS-21EP

## (undated) RX ORDER — LIDOCAINE HYDROCHLORIDE 10 MG/ML
INJECTION, SOLUTION EPIDURAL; INFILTRATION; INTRACAUDAL; PERINEURAL
Status: DISPENSED
Start: 2024-09-23

## (undated) RX ORDER — HEPARIN SODIUM 200 [USP'U]/100ML
INJECTION, SOLUTION INTRAVENOUS
Status: DISPENSED
Start: 2024-09-23

## (undated) RX ORDER — FENTANYL CITRATE 50 UG/ML
INJECTION, SOLUTION INTRAMUSCULAR; INTRAVENOUS
Status: DISPENSED
Start: 2024-09-23

## (undated) RX ORDER — HEPARIN SODIUM 1000 [USP'U]/ML
INJECTION, SOLUTION INTRAVENOUS; SUBCUTANEOUS
Status: DISPENSED
Start: 2024-09-23

## (undated) RX ORDER — PROTAMINE SULFATE 10 MG/ML
INJECTION, SOLUTION INTRAVENOUS
Status: DISPENSED
Start: 2024-09-23